# Patient Record
Sex: FEMALE | Race: WHITE | NOT HISPANIC OR LATINO | Employment: PART TIME | URBAN - METROPOLITAN AREA
[De-identification: names, ages, dates, MRNs, and addresses within clinical notes are randomized per-mention and may not be internally consistent; named-entity substitution may affect disease eponyms.]

---

## 2022-12-26 ENCOUNTER — APPOINTMENT (OUTPATIENT)
Dept: RADIOLOGY | Facility: MEDICAL CENTER | Age: 22
DRG: 518 | End: 2022-12-26
Attending: EMERGENCY MEDICINE
Payer: COMMERCIAL

## 2022-12-26 ENCOUNTER — APPOINTMENT (OUTPATIENT)
Dept: RADIOLOGY | Facility: MEDICAL CENTER | Age: 22
DRG: 518 | End: 2022-12-26
Payer: COMMERCIAL

## 2022-12-26 ENCOUNTER — HOSPITAL ENCOUNTER (INPATIENT)
Facility: MEDICAL CENTER | Age: 22
LOS: 4 days | DRG: 518 | End: 2022-12-30
Attending: EMERGENCY MEDICINE | Admitting: SURGERY
Payer: COMMERCIAL

## 2022-12-26 DIAGNOSIS — S32.591A CLOSED FRACTURE OF RAMUS OF RIGHT PUBIS, INITIAL ENCOUNTER (HCC): ICD-10-CM

## 2022-12-26 DIAGNOSIS — S22.31XA CLOSED FRACTURE OF ONE RIB OF RIGHT SIDE, INITIAL ENCOUNTER: ICD-10-CM

## 2022-12-26 DIAGNOSIS — S32.10XA CLOSED FRACTURE OF SACRUM, UNSPECIFIED PORTION OF SACRUM, INITIAL ENCOUNTER (HCC): ICD-10-CM

## 2022-12-26 DIAGNOSIS — S32.401A CLOSED DISPLACED FRACTURE OF RIGHT ACETABULUM, UNSPECIFIED PORTION OF ACETABULUM, INITIAL ENCOUNTER (HCC): ICD-10-CM

## 2022-12-26 DIAGNOSIS — S32.009A CLOSED FRACTURE OF TRANSVERSE PROCESS OF LUMBAR VERTEBRA, INITIAL ENCOUNTER (HCC): ICD-10-CM
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
## 2022-12-26 PROBLEM — Z78.9 NO CONTRAINDICATION TO DEEP VEIN THROMBOSIS (DVT) PROPHYLAXIS: Status: ACTIVE | Noted: 2022-12-26

## 2022-12-26 PROBLEM — S32.82XA MULTIPLE FRACTURES OF PELVIS WITHOUT DISRUPTION OF PELVIC RING, INITIAL ENCOUNTER FOR CLOSED FRACTURE (HCC): Status: ACTIVE | Noted: 2022-12-26

## 2022-12-26 PROBLEM — T14.90XA TRAUMA: Status: ACTIVE | Noted: 2022-12-26

## 2022-12-26 LAB
ABO GROUP BLD: NORMAL
ALBUMIN SERPL BCP-MCNC: 4.4 G/DL (ref 3.2–4.9)
ALBUMIN/GLOB SERPL: 1.5 G/DL
ALP SERPL-CCNC: 91 U/L (ref 30–99)
ALT SERPL-CCNC: 33 U/L (ref 2–50)
ANION GAP SERPL CALC-SCNC: 14 MMOL/L (ref 7–16)
AST SERPL-CCNC: 48 U/L (ref 12–45)
BILIRUB SERPL-MCNC: 0.4 MG/DL (ref 0.1–1.5)
BLD GP AB SCN SERPL QL: NORMAL
BUN SERPL-MCNC: 17 MG/DL (ref 8–22)
CALCIUM ALBUM COR SERPL-MCNC: 8.9 MG/DL (ref 8.5–10.5)
CALCIUM SERPL-MCNC: 9.2 MG/DL (ref 8.5–10.5)
CHLORIDE SERPL-SCNC: 103 MMOL/L (ref 96–112)
CO2 SERPL-SCNC: 21 MMOL/L (ref 20–33)
CREAT SERPL-MCNC: 0.57 MG/DL (ref 0.5–1.4)
ERYTHROCYTE [DISTWIDTH] IN BLOOD BY AUTOMATED COUNT: 40.9 FL (ref 35.9–50)
ETHANOL BLD-MCNC: <10.1 MG/DL
GFR SERPLBLD CREATININE-BSD FMLA CKD-EPI: 131 ML/MIN/1.73 M 2
GLOBULIN SER CALC-MCNC: 2.9 G/DL (ref 1.9–3.5)
GLUCOSE SERPL-MCNC: 104 MG/DL (ref 65–99)
HCG SERPL QL: NEGATIVE
HCT VFR BLD AUTO: 34.9 % (ref 37–47)
HGB BLD-MCNC: 11.9 G/DL (ref 12–16)
MCH RBC QN AUTO: 31.9 PG (ref 27–33)
MCHC RBC AUTO-ENTMCNC: 34.1 G/DL (ref 33.6–35)
MCV RBC AUTO: 93.6 FL (ref 81.4–97.8)
PLATELET # BLD AUTO: 218 K/UL (ref 164–446)
PMV BLD AUTO: 10.6 FL (ref 9–12.9)
POTASSIUM SERPL-SCNC: 3.2 MMOL/L (ref 3.6–5.5)
PROT SERPL-MCNC: 7.3 G/DL (ref 6–8.2)
RBC # BLD AUTO: 3.73 M/UL (ref 4.2–5.4)
RH BLD: NORMAL
SODIUM SERPL-SCNC: 138 MMOL/L (ref 135–145)
WBC # BLD AUTO: 12.9 K/UL (ref 4.8–10.8)

## 2022-12-26 PROCEDURE — 700111 HCHG RX REV CODE 636 W/ 250 OVERRIDE (IP): Performed by: NURSE PRACTITIONER

## 2022-12-26 PROCEDURE — 82077 ASSAY SPEC XCP UR&BREATH IA: CPT

## 2022-12-26 PROCEDURE — 700111 HCHG RX REV CODE 636 W/ 250 OVERRIDE (IP): Performed by: SURGERY

## 2022-12-26 PROCEDURE — 700102 HCHG RX REV CODE 250 W/ 637 OVERRIDE(OP): Performed by: SURGERY

## 2022-12-26 PROCEDURE — 700101 HCHG RX REV CODE 250: Performed by: SURGERY

## 2022-12-26 PROCEDURE — 80053 COMPREHEN METABOLIC PANEL: CPT

## 2022-12-26 PROCEDURE — 86850 RBC ANTIBODY SCREEN: CPT

## 2022-12-26 PROCEDURE — 86901 BLOOD TYPING SEROLOGIC RH(D): CPT

## 2022-12-26 PROCEDURE — 99291 CRITICAL CARE FIRST HOUR: CPT

## 2022-12-26 PROCEDURE — 72131 CT LUMBAR SPINE W/O DYE: CPT

## 2022-12-26 PROCEDURE — 86900 BLOOD TYPING SEROLOGIC ABO: CPT

## 2022-12-26 PROCEDURE — 99223 1ST HOSP IP/OBS HIGH 75: CPT | Performed by: SURGERY

## 2022-12-26 PROCEDURE — 72128 CT CHEST SPINE W/O DYE: CPT

## 2022-12-26 PROCEDURE — 700105 HCHG RX REV CODE 258: Performed by: SURGERY

## 2022-12-26 PROCEDURE — 85027 COMPLETE CBC AUTOMATED: CPT

## 2022-12-26 PROCEDURE — 700111 HCHG RX REV CODE 636 W/ 250 OVERRIDE (IP): Performed by: EMERGENCY MEDICINE

## 2022-12-26 PROCEDURE — 72192 CT PELVIS W/O DYE: CPT

## 2022-12-26 PROCEDURE — 96376 TX/PRO/DX INJ SAME DRUG ADON: CPT

## 2022-12-26 PROCEDURE — 71260 CT THORAX DX C+: CPT

## 2022-12-26 PROCEDURE — 84703 CHORIONIC GONADOTROPIN ASSAY: CPT

## 2022-12-26 PROCEDURE — 96374 THER/PROPH/DIAG INJ IV PUSH: CPT

## 2022-12-26 PROCEDURE — 770022 HCHG ROOM/CARE - ICU (200)

## 2022-12-26 PROCEDURE — G0390 TRAUMA RESPONS W/HOSP CRITI: HCPCS

## 2022-12-26 PROCEDURE — 700117 HCHG RX CONTRAST REV CODE 255: Performed by: EMERGENCY MEDICINE

## 2022-12-26 PROCEDURE — 99223 1ST HOSP IP/OBS HIGH 75: CPT | Mod: 57 | Performed by: ORTHOPAEDIC SURGERY

## 2022-12-26 PROCEDURE — A9270 NON-COVERED ITEM OR SERVICE: HCPCS | Performed by: SURGERY

## 2022-12-26 RX ORDER — OXYCODONE HYDROCHLORIDE 5 MG/1
5 TABLET ORAL
Status: DISCONTINUED | OUTPATIENT
Start: 2022-12-27 | End: 2022-12-30 | Stop reason: HOSPADM

## 2022-12-26 RX ORDER — ENEMA 19; 7 G/133ML; G/133ML
1 ENEMA RECTAL
Status: DISCONTINUED | OUTPATIENT
Start: 2022-12-26 | End: 2022-12-30 | Stop reason: HOSPADM

## 2022-12-26 RX ORDER — ENOXAPARIN SODIUM 100 MG/ML
30 INJECTION SUBCUTANEOUS EVERY 12 HOURS
Status: DISCONTINUED | OUTPATIENT
Start: 2022-12-27 | End: 2022-12-29

## 2022-12-26 RX ORDER — ACETAMINOPHEN 325 MG/1
650 TABLET ORAL 4 TIMES DAILY
Status: DISCONTINUED | OUTPATIENT
Start: 2022-12-26 | End: 2022-12-30 | Stop reason: HOSPADM

## 2022-12-26 RX ORDER — ONDANSETRON 2 MG/ML
4 INJECTION INTRAMUSCULAR; INTRAVENOUS EVERY 4 HOURS PRN
Status: DISCONTINUED | OUTPATIENT
Start: 2022-12-26 | End: 2022-12-30 | Stop reason: HOSPADM

## 2022-12-26 RX ORDER — HYDROMORPHONE HYDROCHLORIDE 1 MG/ML
0.5 INJECTION, SOLUTION INTRAMUSCULAR; INTRAVENOUS; SUBCUTANEOUS
Status: DISCONTINUED | OUTPATIENT
Start: 2022-12-26 | End: 2022-12-26

## 2022-12-26 RX ORDER — OXYCODONE HYDROCHLORIDE 10 MG/1
10 TABLET ORAL
Status: DISCONTINUED | OUTPATIENT
Start: 2022-12-26 | End: 2022-12-26

## 2022-12-26 RX ORDER — ONDANSETRON 4 MG/1
4 TABLET, ORALLY DISINTEGRATING ORAL EVERY 4 HOURS PRN
Status: DISCONTINUED | OUTPATIENT
Start: 2022-12-26 | End: 2022-12-30 | Stop reason: HOSPADM

## 2022-12-26 RX ORDER — AMOXICILLIN 250 MG
1 CAPSULE ORAL
Status: DISCONTINUED | OUTPATIENT
Start: 2022-12-26 | End: 2022-12-30 | Stop reason: HOSPADM

## 2022-12-26 RX ORDER — PROMETHAZINE HYDROCHLORIDE 25 MG/1
25 SUPPOSITORY RECTAL EVERY 6 HOURS PRN
Status: DISCONTINUED | OUTPATIENT
Start: 2022-12-26 | End: 2022-12-30 | Stop reason: HOSPADM

## 2022-12-26 RX ORDER — BISACODYL 10 MG
10 SUPPOSITORY, RECTAL RECTAL
Status: DISCONTINUED | OUTPATIENT
Start: 2022-12-26 | End: 2022-12-30 | Stop reason: HOSPADM

## 2022-12-26 RX ORDER — LIDOCAINE 50 MG/G
1 PATCH TOPICAL EVERY 24 HOURS
Status: DISCONTINUED | OUTPATIENT
Start: 2022-12-26 | End: 2022-12-30 | Stop reason: HOSPADM

## 2022-12-26 RX ORDER — OXYCODONE HYDROCHLORIDE 10 MG/1
10 TABLET ORAL
Status: DISCONTINUED | OUTPATIENT
Start: 2022-12-27 | End: 2022-12-30 | Stop reason: HOSPADM

## 2022-12-26 RX ORDER — CELECOXIB 200 MG/1
200 CAPSULE ORAL 2 TIMES DAILY
Status: DISCONTINUED | OUTPATIENT
Start: 2022-12-26 | End: 2022-12-30 | Stop reason: HOSPADM

## 2022-12-26 RX ORDER — SODIUM CHLORIDE, SODIUM LACTATE, POTASSIUM CHLORIDE, CALCIUM CHLORIDE 600; 310; 30; 20 MG/100ML; MG/100ML; MG/100ML; MG/100ML
INJECTION, SOLUTION INTRAVENOUS CONTINUOUS
Status: DISCONTINUED | OUTPATIENT
Start: 2022-12-26 | End: 2022-12-28

## 2022-12-26 RX ORDER — AMOXICILLIN 250 MG
1 CAPSULE ORAL NIGHTLY
Status: DISCONTINUED | OUTPATIENT
Start: 2022-12-26 | End: 2022-12-30 | Stop reason: HOSPADM

## 2022-12-26 RX ORDER — HYDROMORPHONE HYDROCHLORIDE 1 MG/ML
.5-1 INJECTION, SOLUTION INTRAMUSCULAR; INTRAVENOUS; SUBCUTANEOUS
Status: DISCONTINUED | OUTPATIENT
Start: 2022-12-27 | End: 2022-12-28

## 2022-12-26 RX ORDER — CELECOXIB 200 MG/1
200 CAPSULE ORAL 2 TIMES DAILY PRN
Status: DISCONTINUED | OUTPATIENT
Start: 2022-12-31 | End: 2022-12-30 | Stop reason: HOSPADM

## 2022-12-26 RX ORDER — OXYCODONE HYDROCHLORIDE 5 MG/1
5 TABLET ORAL
Status: DISCONTINUED | OUTPATIENT
Start: 2022-12-26 | End: 2022-12-26

## 2022-12-26 RX ORDER — PROCHLORPERAZINE EDISYLATE 5 MG/ML
10 INJECTION INTRAMUSCULAR; INTRAVENOUS EVERY 6 HOURS PRN
Status: DISCONTINUED | OUTPATIENT
Start: 2022-12-26 | End: 2022-12-30 | Stop reason: HOSPADM

## 2022-12-26 RX ORDER — GABAPENTIN 100 MG/1
100 CAPSULE ORAL EVERY 8 HOURS
Status: DISCONTINUED | OUTPATIENT
Start: 2022-12-26 | End: 2022-12-30 | Stop reason: HOSPADM

## 2022-12-26 RX ORDER — METAXALONE 800 MG/1
800 TABLET ORAL 3 TIMES DAILY
Status: DISCONTINUED | OUTPATIENT
Start: 2022-12-26 | End: 2022-12-30 | Stop reason: HOSPADM

## 2022-12-26 RX ORDER — MORPHINE SULFATE 4 MG/ML
2 INJECTION INTRAVENOUS
Status: COMPLETED | OUTPATIENT
Start: 2022-12-26 | End: 2022-12-26

## 2022-12-26 RX ORDER — DOCUSATE SODIUM 100 MG/1
100 CAPSULE, LIQUID FILLED ORAL 2 TIMES DAILY
Status: DISCONTINUED | OUTPATIENT
Start: 2022-12-26 | End: 2022-12-30 | Stop reason: HOSPADM

## 2022-12-26 RX ORDER — POLYETHYLENE GLYCOL 3350 17 G/17G
1 POWDER, FOR SOLUTION ORAL 2 TIMES DAILY
Status: DISCONTINUED | OUTPATIENT
Start: 2022-12-26 | End: 2022-12-30 | Stop reason: HOSPADM

## 2022-12-26 RX ADMIN — CELECOXIB 200 MG: 200 CAPSULE ORAL at 18:04

## 2022-12-26 RX ADMIN — PROCHLORPERAZINE EDISYLATE 5 MG: 5 INJECTION INTRAMUSCULAR; INTRAVENOUS at 22:15

## 2022-12-26 RX ADMIN — METAXALONE 800 MG: 800 TABLET ORAL at 18:03

## 2022-12-26 RX ADMIN — SODIUM CHLORIDE, POTASSIUM CHLORIDE, SODIUM LACTATE AND CALCIUM CHLORIDE: 600; 310; 30; 20 INJECTION, SOLUTION INTRAVENOUS at 18:21

## 2022-12-26 RX ADMIN — MORPHINE SULFATE 2 MG: 4 INJECTION, SOLUTION INTRAMUSCULAR; INTRAVENOUS at 16:21

## 2022-12-26 RX ADMIN — ACETAMINOPHEN 650 MG: 325 TABLET ORAL at 18:04

## 2022-12-26 RX ADMIN — LIDOCAINE 1 PATCH: 50 PATCH CUTANEOUS at 17:30

## 2022-12-26 RX ADMIN — IOHEXOL 100 ML: 350 INJECTION, SOLUTION INTRAVENOUS at 15:59

## 2022-12-26 RX ADMIN — DOCUSATE SODIUM 100 MG: 100 CAPSULE, LIQUID FILLED ORAL at 18:04

## 2022-12-26 RX ADMIN — OXYCODONE 5 MG: 5 TABLET ORAL at 22:05

## 2022-12-26 RX ADMIN — ONDANSETRON 4 MG: 4 TABLET, ORALLY DISINTEGRATING ORAL at 21:44

## 2022-12-26 RX ADMIN — GABAPENTIN 100 MG: 100 CAPSULE ORAL at 18:03

## 2022-12-26 RX ADMIN — MORPHINE SULFATE 2 MG: 4 INJECTION, SOLUTION INTRAMUSCULAR; INTRAVENOUS at 17:05

## 2022-12-26 RX ADMIN — HYDROMORPHONE HYDROCHLORIDE 0.5 MG: 1 INJECTION, SOLUTION INTRAMUSCULAR; INTRAVENOUS; SUBCUTANEOUS at 23:09

## 2022-12-26 ASSESSMENT — PAIN DESCRIPTION - PAIN TYPE
TYPE: ACUTE PAIN
TYPE: ACUTE PAIN

## 2022-12-26 ASSESSMENT — LIFESTYLE VARIABLES: DO YOU DRINK ALCOHOL: NO

## 2022-12-26 NOTE — ED NOTES
Pt to blue pod from CT, reports her pain is increasing.  Bilateral mid/lower back pain that wraps around to her thighs.
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
## 2022-12-27 ENCOUNTER — APPOINTMENT (OUTPATIENT)
Dept: RADIOLOGY | Facility: MEDICAL CENTER | Age: 22
DRG: 518 | End: 2022-12-27
Attending: ORTHOPAEDIC SURGERY
Payer: COMMERCIAL

## 2022-12-27 ENCOUNTER — APPOINTMENT (OUTPATIENT)
Dept: RADIOLOGY | Facility: MEDICAL CENTER | Age: 22
DRG: 518 | End: 2022-12-27
Attending: SURGERY
Payer: COMMERCIAL

## 2022-12-27 ENCOUNTER — ANESTHESIA (OUTPATIENT)
Dept: SURGERY | Facility: MEDICAL CENTER | Age: 22
DRG: 518 | End: 2022-12-27
Payer: COMMERCIAL

## 2022-12-27 ENCOUNTER — ANESTHESIA EVENT (OUTPATIENT)
Dept: SURGERY | Facility: MEDICAL CENTER | Age: 22
DRG: 518 | End: 2022-12-27
Payer: COMMERCIAL

## 2022-12-27 PROBLEM — D62 ACUTE BLOOD LOSS ANEMIA: Status: ACTIVE | Noted: 2022-12-27

## 2022-12-27 PROBLEM — Z02.9 DISCHARGE PLANNING ISSUES: Status: ACTIVE | Noted: 2022-12-27

## 2022-12-27 PROBLEM — E87.6 HYPOKALEMIA: Status: ACTIVE | Noted: 2022-12-27

## 2022-12-27 LAB
ABO + RH BLD: NORMAL
ANION GAP SERPL CALC-SCNC: 9 MMOL/L (ref 7–16)
BASOPHILS # BLD AUTO: 0.4 % (ref 0–1.8)
BASOPHILS # BLD: 0.04 K/UL (ref 0–0.12)
BUN SERPL-MCNC: 10 MG/DL (ref 8–22)
CALCIUM SERPL-MCNC: 8.9 MG/DL (ref 8.5–10.5)
CHLORIDE SERPL-SCNC: 101 MMOL/L (ref 96–112)
CO2 SERPL-SCNC: 23 MMOL/L (ref 20–33)
CREAT SERPL-MCNC: 0.42 MG/DL (ref 0.5–1.4)
EOSINOPHIL # BLD AUTO: 0 K/UL (ref 0–0.51)
EOSINOPHIL NFR BLD: 0 % (ref 0–6.9)
ERYTHROCYTE [DISTWIDTH] IN BLOOD BY AUTOMATED COUNT: 41.4 FL (ref 35.9–50)
ERYTHROCYTE [DISTWIDTH] IN BLOOD BY AUTOMATED COUNT: 41.5 FL (ref 35.9–50)
GFR SERPLBLD CREATININE-BSD FMLA CKD-EPI: 141 ML/MIN/1.73 M 2
GLUCOSE SERPL-MCNC: 112 MG/DL (ref 65–99)
HCT VFR BLD AUTO: 30.5 % (ref 37–47)
HCT VFR BLD AUTO: 31.3 % (ref 37–47)
HGB BLD-MCNC: 10.4 G/DL (ref 12–16)
HGB BLD-MCNC: 10.5 G/DL (ref 12–16)
HGB BLD-MCNC: 11.3 G/DL (ref 12–16)
HGB RETIC QN AUTO: 36.5 PG/CELL (ref 29–35)
IMM GRANULOCYTES # BLD AUTO: 0.08 K/UL (ref 0–0.11)
IMM GRANULOCYTES NFR BLD AUTO: 0.8 % (ref 0–0.9)
IMM RETICS NFR: 10 % (ref 9.3–17.4)
IRON SATN MFR SERPL: 18 % (ref 15–55)
IRON SERPL-MCNC: 63 UG/DL (ref 40–170)
LYMPHOCYTES # BLD AUTO: 1.11 K/UL (ref 1–4.8)
LYMPHOCYTES NFR BLD: 10.8 % (ref 22–41)
MAGNESIUM SERPL-MCNC: 1.7 MG/DL (ref 1.5–2.5)
MCH RBC QN AUTO: 31.4 PG (ref 27–33)
MCH RBC QN AUTO: 31.9 PG (ref 27–33)
MCHC RBC AUTO-ENTMCNC: 33.5 G/DL (ref 33.6–35)
MCHC RBC AUTO-ENTMCNC: 34.1 G/DL (ref 33.6–35)
MCV RBC AUTO: 93.6 FL (ref 81.4–97.8)
MCV RBC AUTO: 93.7 FL (ref 81.4–97.8)
MONOCYTES # BLD AUTO: 0.59 K/UL (ref 0–0.85)
MONOCYTES NFR BLD AUTO: 5.7 % (ref 0–13.4)
NEUTROPHILS # BLD AUTO: 8.48 K/UL (ref 2–7.15)
NEUTROPHILS NFR BLD: 82.3 % (ref 44–72)
NRBC # BLD AUTO: 0 K/UL
NRBC BLD-RTO: 0 /100 WBC
PHOSPHATE SERPL-MCNC: 3.1 MG/DL (ref 2.5–4.5)
PLATELET # BLD AUTO: 147 K/UL (ref 164–446)
PLATELET # BLD AUTO: 155 K/UL (ref 164–446)
PMV BLD AUTO: 10.7 FL (ref 9–12.9)
PMV BLD AUTO: 10.8 FL (ref 9–12.9)
POTASSIUM SERPL-SCNC: 3.3 MMOL/L (ref 3.6–5.5)
RBC # BLD AUTO: 3.26 M/UL (ref 4.2–5.4)
RBC # BLD AUTO: 3.34 M/UL (ref 4.2–5.4)
RETICS # AUTO: 0.04 M/UL (ref 0.04–0.06)
RETICS/RBC NFR: 1.2 % (ref 0.8–2.1)
SODIUM SERPL-SCNC: 133 MMOL/L (ref 135–145)
TIBC SERPL-MCNC: 341 UG/DL (ref 250–450)
UIBC SERPL-MCNC: 278 UG/DL (ref 110–370)
WBC # BLD AUTO: 10.3 K/UL (ref 4.8–10.8)
WBC # BLD AUTO: 9.6 K/UL (ref 4.8–10.8)

## 2022-12-27 PROCEDURE — 83550 IRON BINDING TEST: CPT

## 2022-12-27 PROCEDURE — 71045 X-RAY EXAM CHEST 1 VIEW: CPT

## 2022-12-27 PROCEDURE — 700102 HCHG RX REV CODE 250 W/ 637 OVERRIDE(OP): Performed by: ANESTHESIOLOGY

## 2022-12-27 PROCEDURE — 160028 HCHG SURGERY MINUTES - 1ST 30 MINS LEVEL 3: Performed by: ORTHOPAEDIC SURGERY

## 2022-12-27 PROCEDURE — 85027 COMPLETE CBC AUTOMATED: CPT

## 2022-12-27 PROCEDURE — 110371 HCHG SHELL REV 272: Performed by: ORTHOPAEDIC SURGERY

## 2022-12-27 PROCEDURE — 85025 COMPLETE CBC W/AUTO DIFF WBC: CPT

## 2022-12-27 PROCEDURE — 0QS134Z REPOSITION SACRUM WITH INTERNAL FIXATION DEVICE, PERCUTANEOUS APPROACH: ICD-10-PCS | Performed by: ORTHOPAEDIC SURGERY

## 2022-12-27 PROCEDURE — 85018 HEMOGLOBIN: CPT

## 2022-12-27 PROCEDURE — 700102 HCHG RX REV CODE 250 W/ 637 OVERRIDE(OP): Performed by: NURSE PRACTITIONER

## 2022-12-27 PROCEDURE — 27216 TREAT PELVIC RING FRACTURE: CPT | Mod: 80ROC,59,RT | Performed by: STUDENT IN AN ORGANIZED HEALTH CARE EDUCATION/TRAINING PROGRAM

## 2022-12-27 PROCEDURE — 770022 HCHG ROOM/CARE - ICU (200)

## 2022-12-27 PROCEDURE — 99233 SBSQ HOSP IP/OBS HIGH 50: CPT | Performed by: SURGERY

## 2022-12-27 PROCEDURE — 700102 HCHG RX REV CODE 250 W/ 637 OVERRIDE(OP): Performed by: SURGERY

## 2022-12-27 PROCEDURE — 83540 ASSAY OF IRON: CPT

## 2022-12-27 PROCEDURE — 700111 HCHG RX REV CODE 636 W/ 250 OVERRIDE (IP): Performed by: SURGERY

## 2022-12-27 PROCEDURE — A9270 NON-COVERED ITEM OR SERVICE: HCPCS | Performed by: SURGERY

## 2022-12-27 PROCEDURE — A9270 NON-COVERED ITEM OR SERVICE: HCPCS | Performed by: NURSE PRACTITIONER

## 2022-12-27 PROCEDURE — 72190 X-RAY EXAM OF PELVIS: CPT

## 2022-12-27 PROCEDURE — 700105 HCHG RX REV CODE 258: Performed by: ORTHOPAEDIC SURGERY

## 2022-12-27 PROCEDURE — 80048 BASIC METABOLIC PNL TOTAL CA: CPT

## 2022-12-27 PROCEDURE — 700111 HCHG RX REV CODE 636 W/ 250 OVERRIDE (IP): Performed by: ANESTHESIOLOGY

## 2022-12-27 PROCEDURE — 160035 HCHG PACU - 1ST 60 MINS PHASE I: Performed by: ORTHOPAEDIC SURGERY

## 2022-12-27 PROCEDURE — 160002 HCHG RECOVERY MINUTES (STAT): Performed by: ORTHOPAEDIC SURGERY

## 2022-12-27 PROCEDURE — 85046 RETICYTE/HGB CONCENTRATE: CPT

## 2022-12-27 PROCEDURE — 01160 ANES CLSD PX SYMPH PUB/SI JT: CPT | Performed by: ANESTHESIOLOGY

## 2022-12-27 PROCEDURE — 700105 HCHG RX REV CODE 258: Performed by: SURGERY

## 2022-12-27 PROCEDURE — 700111 HCHG RX REV CODE 636 W/ 250 OVERRIDE (IP): Performed by: ORTHOPAEDIC SURGERY

## 2022-12-27 PROCEDURE — 160048 HCHG OR STATISTICAL LEVEL 1-5: Performed by: ORTHOPAEDIC SURGERY

## 2022-12-27 PROCEDURE — 84100 ASSAY OF PHOSPHORUS: CPT

## 2022-12-27 PROCEDURE — 83735 ASSAY OF MAGNESIUM: CPT

## 2022-12-27 PROCEDURE — A9270 NON-COVERED ITEM OR SERVICE: HCPCS | Performed by: ANESTHESIOLOGY

## 2022-12-27 PROCEDURE — 700111 HCHG RX REV CODE 636 W/ 250 OVERRIDE (IP): Performed by: NURSE PRACTITIONER

## 2022-12-27 PROCEDURE — C1713 ANCHOR/SCREW BN/BN,TIS/BN: HCPCS | Performed by: ORTHOPAEDIC SURGERY

## 2022-12-27 PROCEDURE — 160039 HCHG SURGERY MINUTES - EA ADDL 1 MIN LEVEL 3: Performed by: ORTHOPAEDIC SURGERY

## 2022-12-27 PROCEDURE — 160009 HCHG ANES TIME/MIN: Performed by: ORTHOPAEDIC SURGERY

## 2022-12-27 PROCEDURE — 27216 TREAT PELVIC RING FRACTURE: CPT | Mod: 59,RT | Performed by: ORTHOPAEDIC SURGERY

## 2022-12-27 PROCEDURE — 700101 HCHG RX REV CODE 250: Performed by: ANESTHESIOLOGY

## 2022-12-27 DEVICE — IMPLANTABLE DEVICE: Type: IMPLANTABLE DEVICE | Site: PELVIS | Status: FUNCTIONAL

## 2022-12-27 DEVICE — SCREW CANNULATED FULLY THREADED 7.3MM X 75MM (3TX3=9): Type: IMPLANTABLE DEVICE | Site: PELVIS | Status: FUNCTIONAL

## 2022-12-27 DEVICE — SCREW CANNULATED 32MM THREAD 7.3MM X 90MM (3TX3=9): Type: IMPLANTABLE DEVICE | Site: PELVIS | Status: FUNCTIONAL

## 2022-12-27 DEVICE — WASHER FOR 7.3MM CANNULATED SCREWS 13.0MM  (3TX18=54) (6EA/PK): Type: IMPLANTABLE DEVICE | Site: PELVIS | Status: FUNCTIONAL

## 2022-12-27 RX ORDER — HYDROMORPHONE HYDROCHLORIDE 1 MG/ML
0.2 INJECTION, SOLUTION INTRAMUSCULAR; INTRAVENOUS; SUBCUTANEOUS
Status: DISCONTINUED | OUTPATIENT
Start: 2022-12-27 | End: 2022-12-27 | Stop reason: HOSPADM

## 2022-12-27 RX ORDER — HALOPERIDOL 5 MG/ML
1 INJECTION INTRAMUSCULAR
Status: DISCONTINUED | OUTPATIENT
Start: 2022-12-27 | End: 2022-12-27 | Stop reason: HOSPADM

## 2022-12-27 RX ORDER — POTASSIUM CHLORIDE 7.45 MG/ML
10 INJECTION INTRAVENOUS
Status: DISPENSED | OUTPATIENT
Start: 2022-12-27 | End: 2022-12-27

## 2022-12-27 RX ORDER — ONDANSETRON 2 MG/ML
INJECTION INTRAMUSCULAR; INTRAVENOUS PRN
Status: DISCONTINUED | OUTPATIENT
Start: 2022-12-27 | End: 2022-12-27 | Stop reason: SURG

## 2022-12-27 RX ORDER — DEXAMETHASONE SODIUM PHOSPHATE 4 MG/ML
INJECTION, SOLUTION INTRA-ARTICULAR; INTRALESIONAL; INTRAMUSCULAR; INTRAVENOUS; SOFT TISSUE PRN
Status: DISCONTINUED | OUTPATIENT
Start: 2022-12-27 | End: 2022-12-27 | Stop reason: SURG

## 2022-12-27 RX ORDER — HYDROMORPHONE HYDROCHLORIDE 1 MG/ML
0.1 INJECTION, SOLUTION INTRAMUSCULAR; INTRAVENOUS; SUBCUTANEOUS
Status: DISCONTINUED | OUTPATIENT
Start: 2022-12-27 | End: 2022-12-27 | Stop reason: HOSPADM

## 2022-12-27 RX ORDER — HYDROMORPHONE HYDROCHLORIDE 2 MG/ML
INJECTION, SOLUTION INTRAMUSCULAR; INTRAVENOUS; SUBCUTANEOUS PRN
Status: DISCONTINUED | OUTPATIENT
Start: 2022-12-27 | End: 2022-12-27 | Stop reason: SURG

## 2022-12-27 RX ORDER — HYDROMORPHONE HYDROCHLORIDE 1 MG/ML
0.4 INJECTION, SOLUTION INTRAMUSCULAR; INTRAVENOUS; SUBCUTANEOUS
Status: DISCONTINUED | OUTPATIENT
Start: 2022-12-27 | End: 2022-12-27 | Stop reason: HOSPADM

## 2022-12-27 RX ORDER — DIPHENHYDRAMINE HYDROCHLORIDE 50 MG/ML
12.5 INJECTION INTRAMUSCULAR; INTRAVENOUS
Status: DISCONTINUED | OUTPATIENT
Start: 2022-12-27 | End: 2022-12-27 | Stop reason: HOSPADM

## 2022-12-27 RX ORDER — OXYCODONE HCL 5 MG/5 ML
5 SOLUTION, ORAL ORAL
Status: COMPLETED | OUTPATIENT
Start: 2022-12-27 | End: 2022-12-27

## 2022-12-27 RX ORDER — ROCURONIUM BROMIDE 10 MG/ML
INJECTION, SOLUTION INTRAVENOUS PRN
Status: DISCONTINUED | OUTPATIENT
Start: 2022-12-27 | End: 2022-12-27 | Stop reason: SURG

## 2022-12-27 RX ORDER — OXYCODONE HCL 5 MG/5 ML
10 SOLUTION, ORAL ORAL
Status: COMPLETED | OUTPATIENT
Start: 2022-12-27 | End: 2022-12-27

## 2022-12-27 RX ORDER — ONDANSETRON 2 MG/ML
4 INJECTION INTRAMUSCULAR; INTRAVENOUS
Status: DISCONTINUED | OUTPATIENT
Start: 2022-12-27 | End: 2022-12-27 | Stop reason: HOSPADM

## 2022-12-27 RX ORDER — MEPERIDINE HYDROCHLORIDE 25 MG/ML
6.25 INJECTION INTRAMUSCULAR; INTRAVENOUS; SUBCUTANEOUS
Status: DISCONTINUED | OUTPATIENT
Start: 2022-12-27 | End: 2022-12-27 | Stop reason: HOSPADM

## 2022-12-27 RX ORDER — MIDAZOLAM HYDROCHLORIDE 1 MG/ML
INJECTION INTRAMUSCULAR; INTRAVENOUS PRN
Status: DISCONTINUED | OUTPATIENT
Start: 2022-12-27 | End: 2022-12-27 | Stop reason: SURG

## 2022-12-27 RX ORDER — CEFAZOLIN SODIUM 1 G/3ML
INJECTION, POWDER, FOR SOLUTION INTRAMUSCULAR; INTRAVENOUS PRN
Status: DISCONTINUED | OUTPATIENT
Start: 2022-12-27 | End: 2022-12-27 | Stop reason: SURG

## 2022-12-27 RX ADMIN — OXYCODONE HYDROCHLORIDE 5 MG: 5 SOLUTION ORAL at 15:37

## 2022-12-27 RX ADMIN — HYDROMORPHONE HYDROCHLORIDE 1 MG: 1 INJECTION, SOLUTION INTRAMUSCULAR; INTRAVENOUS; SUBCUTANEOUS at 04:11

## 2022-12-27 RX ADMIN — ROCURONIUM BROMIDE 40 MG: 10 INJECTION, SOLUTION INTRAVENOUS at 13:50

## 2022-12-27 RX ADMIN — HYDROMORPHONE HYDROCHLORIDE 1 MG: 1 INJECTION, SOLUTION INTRAMUSCULAR; INTRAVENOUS; SUBCUTANEOUS at 00:07

## 2022-12-27 RX ADMIN — ACETAMINOPHEN 650 MG: 325 TABLET ORAL at 06:15

## 2022-12-27 RX ADMIN — POLYETHYLENE GLYCOL 3350 1 PACKET: 17 POWDER, FOR SOLUTION ORAL at 17:13

## 2022-12-27 RX ADMIN — HYDROMORPHONE HYDROCHLORIDE 1 MG: 1 INJECTION, SOLUTION INTRAMUSCULAR; INTRAVENOUS; SUBCUTANEOUS at 07:42

## 2022-12-27 RX ADMIN — HYDROMORPHONE HYDROCHLORIDE 1 MG: 1 INJECTION, SOLUTION INTRAMUSCULAR; INTRAVENOUS; SUBCUTANEOUS at 19:47

## 2022-12-27 RX ADMIN — METAXALONE 800 MG: 800 TABLET ORAL at 06:28

## 2022-12-27 RX ADMIN — CEFAZOLIN 2 G: 330 INJECTION, POWDER, FOR SOLUTION INTRAMUSCULAR; INTRAVENOUS at 13:59

## 2022-12-27 RX ADMIN — OXYCODONE HYDROCHLORIDE 10 MG: 10 TABLET ORAL at 06:15

## 2022-12-27 RX ADMIN — ONDANSETRON 4 MG: 2 INJECTION INTRAMUSCULAR; INTRAVENOUS at 18:20

## 2022-12-27 RX ADMIN — MIDAZOLAM HYDROCHLORIDE 2 MG: 1 INJECTION, SOLUTION INTRAMUSCULAR; INTRAVENOUS at 13:42

## 2022-12-27 RX ADMIN — OXYCODONE HYDROCHLORIDE 10 MG: 10 TABLET ORAL at 02:05

## 2022-12-27 RX ADMIN — MEPERIDINE HYDROCHLORIDE 6.25 MG: 25 INJECTION INTRAMUSCULAR; INTRAVENOUS; SUBCUTANEOUS at 15:34

## 2022-12-27 RX ADMIN — OXYCODONE 5 MG: 5 TABLET ORAL at 18:51

## 2022-12-27 RX ADMIN — SODIUM CHLORIDE, POTASSIUM CHLORIDE, SODIUM LACTATE AND CALCIUM CHLORIDE: 600; 310; 30; 20 INJECTION, SOLUTION INTRAVENOUS at 04:15

## 2022-12-27 RX ADMIN — HYDROMORPHONE HYDROCHLORIDE 0.25 MG: 2 INJECTION INTRAMUSCULAR; INTRAVENOUS; SUBCUTANEOUS at 15:00

## 2022-12-27 RX ADMIN — DOCUSATE SODIUM 100 MG: 100 CAPSULE, LIQUID FILLED ORAL at 17:08

## 2022-12-27 RX ADMIN — POTASSIUM CHLORIDE 10 MEQ: 7.46 INJECTION, SOLUTION INTRAVENOUS at 10:11

## 2022-12-27 RX ADMIN — GABAPENTIN 100 MG: 100 CAPSULE ORAL at 17:08

## 2022-12-27 RX ADMIN — PROCHLORPERAZINE EDISYLATE 10 MG: 5 INJECTION INTRAMUSCULAR; INTRAVENOUS at 21:48

## 2022-12-27 RX ADMIN — PROPOFOL 150 MG: 10 INJECTION, EMULSION INTRAVENOUS at 13:49

## 2022-12-27 RX ADMIN — ACETAMINOPHEN 650 MG: 325 TABLET ORAL at 17:08

## 2022-12-27 RX ADMIN — ACETAMINOPHEN 650 MG: 325 TABLET ORAL at 00:10

## 2022-12-27 RX ADMIN — OXYCODONE HYDROCHLORIDE 10 MG: 10 TABLET ORAL at 22:06

## 2022-12-27 RX ADMIN — CELECOXIB 200 MG: 200 CAPSULE ORAL at 06:15

## 2022-12-27 RX ADMIN — FENTANYL CITRATE 50 MCG: 50 INJECTION, SOLUTION INTRAMUSCULAR; INTRAVENOUS at 13:49

## 2022-12-27 RX ADMIN — GABAPENTIN 100 MG: 100 CAPSULE ORAL at 10:12

## 2022-12-27 RX ADMIN — ONDANSETRON 4 MG: 2 INJECTION INTRAMUSCULAR; INTRAVENOUS at 09:32

## 2022-12-27 RX ADMIN — HYDROMORPHONE HYDROCHLORIDE 1 MG: 1 INJECTION, SOLUTION INTRAMUSCULAR; INTRAVENOUS; SUBCUTANEOUS at 03:06

## 2022-12-27 RX ADMIN — CEFAZOLIN 2 G: 2 INJECTION, POWDER, FOR SOLUTION INTRAMUSCULAR; INTRAVENOUS at 21:31

## 2022-12-27 RX ADMIN — ONDANSETRON 4 MG: 4 TABLET, ORALLY DISINTEGRATING ORAL at 16:27

## 2022-12-27 RX ADMIN — METAXALONE 800 MG: 800 TABLET ORAL at 18:25

## 2022-12-27 RX ADMIN — GABAPENTIN 100 MG: 100 CAPSULE ORAL at 00:10

## 2022-12-27 RX ADMIN — ONDANSETRON 4 MG: 2 INJECTION INTRAMUSCULAR; INTRAVENOUS at 14:51

## 2022-12-27 RX ADMIN — SUGAMMADEX 150 MG: 100 INJECTION, SOLUTION INTRAVENOUS at 14:53

## 2022-12-27 RX ADMIN — ENOXAPARIN SODIUM 30 MG: 30 INJECTION SUBCUTANEOUS at 17:08

## 2022-12-27 RX ADMIN — CELECOXIB 200 MG: 200 CAPSULE ORAL at 17:08

## 2022-12-27 RX ADMIN — SODIUM CHLORIDE, POTASSIUM CHLORIDE, SODIUM LACTATE AND CALCIUM CHLORIDE: 600; 310; 30; 20 INJECTION, SOLUTION INTRAVENOUS at 23:01

## 2022-12-27 RX ADMIN — DEXAMETHASONE SODIUM PHOSPHATE 6 MG: 4 INJECTION, SOLUTION INTRA-ARTICULAR; INTRALESIONAL; INTRAMUSCULAR; INTRAVENOUS; SOFT TISSUE at 13:59

## 2022-12-27 ASSESSMENT — PATIENT HEALTH QUESTIONNAIRE - PHQ9
2. FEELING DOWN, DEPRESSED, IRRITABLE, OR HOPELESS: NOT AT ALL
SUM OF ALL RESPONSES TO PHQ9 QUESTIONS 1 AND 2: 0
1. LITTLE INTEREST OR PLEASURE IN DOING THINGS: NOT AT ALL

## 2022-12-27 ASSESSMENT — LIFESTYLE VARIABLES
ON A TYPICAL DAY WHEN YOU DRINK ALCOHOL HOW MANY DRINKS DO YOU HAVE: 0
CONSUMPTION TOTAL: NEGATIVE
HOW MANY TIMES IN THE PAST YEAR HAVE YOU HAD 5 OR MORE DRINKS IN A DAY: 0
HAVE PEOPLE ANNOYED YOU BY CRITICIZING YOUR DRINKING: NO
DOES PATIENT WANT TO STOP DRINKING: NO
EVER HAD A DRINK FIRST THING IN THE MORNING TO STEADY YOUR NERVES TO GET RID OF A HANGOVER: NO
TOTAL SCORE: 0
HAVE YOU EVER FELT YOU SHOULD CUT DOWN ON YOUR DRINKING: NO
EVER FELT BAD OR GUILTY ABOUT YOUR DRINKING: NO
TOTAL SCORE: 0
ALCOHOL_USE: NO
AVERAGE NUMBER OF DAYS PER WEEK YOU HAVE A DRINK CONTAINING ALCOHOL: 0
TOTAL SCORE: 0

## 2022-12-27 ASSESSMENT — ENCOUNTER SYMPTOMS
FOCAL WEAKNESS: 0
NEUROLOGICAL NEGATIVE: 1
MYALGIAS: 1
PSYCHIATRIC NEGATIVE: 1
RESPIRATORY NEGATIVE: 1
SENSORY CHANGE: 0

## 2022-12-27 ASSESSMENT — PAIN SCALES - GENERAL: PAIN_LEVEL: 6

## 2022-12-27 NOTE — ANESTHESIA PROCEDURE NOTES
Airway    Date/Time: 12/27/2022 1:52 PM  Performed by: Catherine Carrillo M.D.  Authorized by: Catherine Carrillo M.D.     Location:  OR  Urgency:  Elective  Indications for Airway Management:  Anesthesia      Spontaneous Ventilation: absent    Sedation Level:  Deep  Preoxygenated: Yes    Patient Position:  Sniffing  Mask Difficulty Assessment:  1 - vent by mask  Final Airway Type:  Endotracheal airway  Final Endotracheal Airway:  ETT  Cuffed: Yes    Technique Used for Successful ETT Placement:  Direct laryngoscopy    Insertion Site:  Oral  Blade Type:  Malcolm  Laryngoscope Blade/Videolaryngoscope Blade Size:  3  ETT Size (mm):  7.0  Measured from:  Teeth  ETT to Teeth (cm):  19  Placement Verified by: auscultation and capnometry    Cormack-Lehane Classification:  Grade I - full view of glottis  Number of Attempts at Approach:  1  Ventilation Between Attempts:  BVM  Number of Other Approaches Attempted:  0
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
## 2022-12-27 NOTE — ED NOTES
Med rec completed per patient at bedside.  Allergies reviewed with patient. NKDA.  Patient from out of area. No preferred local pharmacy.     Patient denies using any prescription medications at home.  No recent over-the-counter medications.  No outpatient antibiotics within the last 30 days.
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
## 2022-12-27 NOTE — ASSESSMENT & PLAN NOTE
Acute comminuted displaced fractures of the bilateral sacral alar, right more than left.   Acute displaced fractures of the right inferior pubic ramus and right anterior acetabulum..  12/27 percutaneous fixation of bilateral sacral fractures.   Weight bearing status - Touch toe weightbearing RLE, Pivot LLE for 6 weeks.  Praneeth Goodwin MD. Orthopedic Surgeon. [facility].

## 2022-12-27 NOTE — ASSESSMENT & PLAN NOTE
Acute nondisplaced fracture of the right posterior 11th rib.  Aggressive pulmonary hygiene and multimodal pain management.

## 2022-12-27 NOTE — ANESTHESIA TIME REPORT
Anesthesia Start and Stop Event Times     Date Time Event    12/27/2022 01:31 PM Ready for Procedure    12/27/2022 01:42 PM Anesthesia Start    12/27/2022 03:10 PM Anesthesia Stop        Responsible Staff  12/27/22    Name Role Begin End    Catherine Carrillo M.D. Anesthesiologist 12/27/22 01:42 PM 12/27/22 03:10 PM        Overtime Reason:  overtime    Comments:

## 2022-12-27 NOTE — CARE PLAN
The patient is Watcher - Medium risk of patient condition declining or worsening    Shift Goals  Clinical Goals: Surgery.  Patient Goals: Rest, pain control.  Family Goals: GLENN    Progress made toward(s) clinical / shift goals:  The patient and family were updated on plan of care. Pain is controlled with PRN pain medication.     Problem: Knowledge Deficit - Standard  Goal: Patient and family/care givers will demonstrate understanding of plan of care, disease process/condition, diagnostic tests and medications  12/27/2022 1232 by Rhonda Shen, R.N.  Outcome: Progressing    Problem: Pain - Standard  Goal: Alleviation of pain or a reduction in pain to the patient’s comfort goal  12/27/2022 1232 by Rhonda Shen, R.N.  Outcome: Progressing     Problem: Skin Integrity  Goal: Skin integrity is maintained or improved  Outcome: Progressing

## 2022-12-27 NOTE — H&P
"    TRAUMA HISTORY AND PHYSICAL    DATE OF SERVICE: 12/26/2022    ACTIVATION LEVEL: Green.     HISTORY OF PRESENT ILLNESS: The patient is a 22 year-old female who was injured after crashing a sled into a concrete wall.     The patient was triaged to [facility] in accordance with established pre hospital protocols. An expeditious primary and secondary survey with required adjuncts was conducted. See Trauma Narrator for full details.    The patient reports ongoing pelvic and low back pain.  Vitals are reassuring.      PAST MEDICAL HISTORY: History reviewed. No pertinent past medical history.      PAST SURGICAL HISTORY: History reviewed. No pertinent surgical history.       ALLERGIES: Patient has no known allergies.      CURRENT MEDICATIONS:   Patient reports none    FAMILY HISTORY:   Reviewed and found to be non-contributory in regards to the above presentation    SOCIAL HISTORY:  reports that she has never smoked. She has never used smokeless tobacco. She reports that she does not currently use alcohol. She reports that she does not currently use drugs.      REVIEW OF SYSTEMS:   Comprehensive review of systems is negative with the exception of the aforementioned HPI, PMH, and PSH bullets in accordance with CMS guidelines.    PHYSICAL EXAMINATION:   Vital Signs: /58   Pulse 97   Temp 36.6 °C (97.9 °F) (Temporal)   Resp 17   Ht 1.626 m (5' 4\")   Wt 56.7 kg (125 lb)   SpO2 98%   Physical Exam  Constitutional:       Appearance: Normal appearance.   HENT:      Head: Normocephalic and atraumatic.      Right Ear: Hearing and external ear normal.      Left Ear: Hearing and external ear normal.      Nose: Nose normal.      Mouth/Throat:      Lips: Pink.      Mouth: Mucous membranes are moist.      Pharynx: Oropharynx is clear.   Eyes:      General: Lids are normal.      Conjunctiva/sclera: Conjunctivae normal.      Pupils: Pupils are equal, round, and reactive to light.   Neck:      Trachea: " Trachea normal.   Cardiovascular:      Rate and Rhythm: Normal rate and regular rhythm.      Pulses: Normal pulses.   Pulmonary:      Effort: Pulmonary effort is normal.      Breath sounds: Normal breath sounds and air entry. No decreased breath sounds.   Chest:      Chest wall: Tenderness present. No swelling or crepitus.   Abdominal:      General: Abdomen is flat. There is no distension.      Palpations: Abdomen is soft.      Tenderness: There is no abdominal tenderness.   Musculoskeletal:      Cervical back: Full passive range of motion without pain and neck supple.   Skin:     General: Skin is warm and dry.      Capillary Refill: Capillary refill takes less than 2 seconds.   Neurological:      General: No focal deficit present.      Mental Status: She is alert.      GCS: GCS eye subscore is 4. GCS verbal subscore is 5. GCS motor subscore is 6.      Cranial Nerves: Cranial nerves 2-12 are intact.      Sensory: Sensation is intact.      Motor: Motor function is intact.   Psychiatric:         Behavior: Behavior is cooperative.       LABORATORY VALUES:   Recent Labs     12/26/22  1528   WBC 12.9*   RBC 3.73*   HEMOGLOBIN 11.9*   HEMATOCRIT 34.9*   MCV 93.6   MCH 31.9   MCHC 34.1   RDW 40.9   PLATELETCT 218   MPV 10.6     Recent Labs     12/26/22  1528   SODIUM 138   POTASSIUM 3.2*   CHLORIDE 103   CO2 21   GLUCOSE 104*   BUN 17   CREATININE 0.57   CALCIUM 9.2     Recent Labs     12/26/22  1528   ASTSGOT 48*   ALTSGPT 33   TBILIRUBIN 0.4   ALKPHOSPHAT 91   GLOBULIN 2.9            IMAGING:   CT-LSPINE W/O PLUS RECONS   Final Result         Acute mildly displaced fractures of the right transverse processes of L1, L2, L3 and L4.         CT-TSPINE W/O PLUS RECONS   Final Result         1. No acute fracture or malalignment appreciated in the thoracic spine         CT-CHEST,ABDOMEN,PELVIS WITH   Final Result         1. Acute nondisplaced fracture of the right posterior 11th rib      2. Acute comminuted displaced fractures  of the bilateral sacral alar, right more than left.      3. Acute displaced fractures of the right inferior pubic ramus and right anterior acetabulum.      CT-PELVIS W/O PLUS RECONS    (Results Pending)       IMPRESSION AND PLAN:  * Multiple fractures of pelvis without disruption of pelvic ring, initial encounter for closed fracture (HCC)- (present on admission)  Assessment & Plan  Acute comminuted displaced fractures of the bilateral sacral alar, right more than left.   Acute displaced fractures of the right inferior pubic ramus and right anterior acetabulum..  Definitive plan pending.  Weight bearing status - Nonweightbearing BLE.  Praneeth Goodwin MD. Orthopedic Surgeon. [facility].      Closed fracture of transverse process of lumbar vertebra (HCC)- (present on admission)  Assessment & Plan  Acute mildly displaced fractures of the right transverse processes of L1, L2, L3 and L4.  Multi-modal pain regimen.    Fracture of one rib of right side- (present on admission)  Assessment & Plan  Acute nondisplaced fracture of the right posterior 11th rib   Multi-modal pain regimen  Follow up chest x-ray in am    Trauma- (present on admission)  Assessment & Plan  Green Activation  Sledding crash into concrete wall    No contraindication to deep vein thrombosis (DVT) prophylaxis- (present on admission)  Assessment & Plan  Prophylactic dose enoxaparin initiated upon admission.        I independently reviewed pertinent clinical lab tests since admission and ordered additional follow up clinical lab tests.  I independently reviewed pertinent radiographic images and the radiologist's reports since admission and ordered additional follow up radiographic studies.  The details of the available patient records in [facility] (including laboratory tests, culture data, medications, imaging, and other pertinent diagnostic tests) and that information was utilized as warranted in today's medical decision making for this patient.  I  personally evaluated the patient condition at bedside.    Care interventions include:   Review of interval medical and surgical history, current medications and outpatient medication reconciliation, interval imaging studies and radiologist interpretation, and interval laboratory values.  Management of rib fractures and orthopedic trauma.  coordination, prioritization, and implementation of therapeutic interventions for orthopedic injuries  Management of deep venous thrombosis prophylaxis.  Management of multi-modal pain regimen.    Aggregated care time spent evaluating, reassessing, reviewing documentation, providing care, and managing this patient exclusive of procedures: 60 minutes  ____________________________________   Del Howard M.D.     JARAD / NTS

## 2022-12-27 NOTE — ANESTHESIA POSTPROCEDURE EVALUATION
Patient: Yohana Alvarez    Procedure Summary     Date: 12/27/22 Room / Location: [place] [address] / SURGERY [facility]    Anesthesia Start: 1342 Anesthesia Stop: 1510    Procedure: PINNING, FRACTURE, PERCUTANEOUS (Bilateral: Coccyx) Diagnosis: (BILATERAL SACRAL FRACTURES)    Surgeons: Stephan Mensah M.D. Responsible Provider: Catherine Carrillo M.D.    Anesthesia Type: general ASA Status: 2          Final Anesthesia Type: general  Last vitals  BP   Blood Pressure: 97/52    Temp   36.2 °C (97.1 °F)    Pulse   69   Resp   12    SpO2   98 %      Anesthesia Post Evaluation    Patient location during evaluation: PACU  Patient participation: complete - patient participated  Level of consciousness: awake and alert  Pain score: 6    Airway patency: patent  Anesthetic complications: no  Cardiovascular status: hemodynamically stable  Respiratory status: acceptable  Hydration status: euvolemic    PONV: none          No notable events documented.     Nurse Pain Score: 6 (NPRS)

## 2022-12-27 NOTE — ANESTHESIA PREPROCEDURE EVALUATION
Case: [id number] Date/Time: 12/27/22 1055    Procedure: PINNING, FRACTURE, PERCUTANEOUS (Bilateral: Coccyx) - Sacral   7.3 Cannulated Screws OIC  Flat OSI    Anesthesia type: General    Location: [place] [address] / SURGERY [facility]    Surgeons: Stephan Mensah M.D.          Relevant Problems   No relevant active problems       Physical Exam    Airway   Mallampati: II  TM distance: >3 FB  Neck ROM: full       Cardiovascular - normal exam  Rhythm: regular  Rate: normal  (-) murmur     Dental - normal exam           Pulmonary - normal exam  Breath sounds clear to auscultation     Abdominal    Neurological - normal exam                 Anesthesia Plan    ASA 2       Plan - general       Airway plan will be ETT        Plan Factors:   Patient was previously instructed to abstain from smoking on day of procedure.  Patient did not smoke on day of procedure.      Induction: intravenous    Postoperative Plan: Postoperative administration of opioids is intended.        Informed Consent:    Anesthetic plan and risks discussed with patient.    Use of blood products discussed with: patient whom consented to blood products.

## 2022-12-27 NOTE — OP REPORT
DATE OF OPERATION: 12/27/2022     PREOPERATIVE DIAGNOSIS: 1.  Right sacral fracture  2.  Left sacral fracture    POSTOPERATIVE DIAGNOSIS: Same    PROCEDURE PERFORMED: 1.  Left S1 sacroiliac screw 2.  Right S1 sacroiliac screw 3.  Transiliac transsacral screw S2    SURGEON: Stephan Mensah M.D.     ASSISTANT: Peyman Gao    ANESTHESIOLOGIST: Catherine Carrillo MD.    ANESTHESIA: General    ESTIMATED BLOOD LOSS: 10 mL    INDICATIONS: The patient is a 22 y.o. female with a pelvic ring injury consisting of bilateral sacral fractures after sledding accident.  I discussed the risks and benefits of the procedure, including the risks of pain, stiffness, infection, wound healing complication, neurovascular injury, malunion, non-union, malrotation, growth arrest and the medical risks of anesthesia including DVT, PE, MI, stroke, and death.  Benefits include early mobilization, improved chance of union, and reduction in risks of growth deformity.  Alternatives to surgery were also discussed, including non-operative management.  The patients parent signed the informed consent and the operative extremity was marked.      PROCEDURE:  The patient underwent anesthesia, and was positioned supine on a radiolucent table and all bony prominences were well padded.  Preoperative antibiotics were administered. Sequential compression devices were employed. The correct operative site was prepped and draped into a sterile field. A procedural pause was conducted to verify correct patient, correct extremity, presence of the surgeons initials on the operative extremity.    A right S1 sacroiliac screw was placed under fluoroscopic guidance using AP inlet and outlet views.  Anatomic reduction was achieved and screw was found to be within bony quarters throughout its course.  A left S1 sacroiliac screw was then placed under fluoroscopic guidance using AP inlet and outlet views.  Anatomic reduction was achieved and the screw is found to be  within bony corridor doors throughout its course.  To achieve additional stability a transsacral transiliac screw was placed into S2 using AP inlet outlet and lateral views.  Good reduction was achieved throughout wounds irrigated and closed with staples    The patient tolerated the procedure well. There were no apparent complications. All sponge, needle, and instrument counts were correct on two separate occasions. She was awakened, extubated, and transferred to the recovery room in satisfactory condition.     The use of Peyman Gao as a surgical assistant was necessary for assistance with exposure, retraction, fracture reduction, instrumentation, and closure.    Post-Operative Plan:    1.  The patient should be nonweightbearing on right lower extremity and pivot on the left  2.  IV antibiotics - may be continued for 24 hours  3.  Discharge planning  ____________________________________   Stephan Mesnah M.D.   DD: 12/27/2022  3:08 PM

## 2022-12-27 NOTE — PROGRESS NOTES
Patient to S-120 on transport monitor ACLS RN. ICU monitor placed.   Vital Signs:   HR: 91  BP: 109/58  O2 saturation: 98%   RR: 18  Temp: 99.1  Weight: Unable to obtain at this time because the scale on the patient's bed is not working.   Personal Belongings: All of patient's personal belongings taken home by patient's mom.     4 Eyes Skin Assessment Completed by Eva RN and Isis RN.    Head WDL  Ears WDL  Nose WDL  Mouth WDL  Neck WDL  Breast/Chest WDL  Shoulder Blades WDL  Spine WDL  (R) Arm/Elbow/Hand WDL  (L) Arm/Elbow/Hand WDL  Abdomen WDL  Groin WDL  Scrotum/Coccyx/Buttocks WDL  (R) Leg WDL  (L) Leg WDL  (R) Heel/Foot/Toe Redness and Blanching  (L) Heel/Foot/Toe Redness and Blanching          Devices In Places ECG, Blood Pressure Cuff, Pulse Ox, and SCD's      Interventions In Place Sacral Mepilex, Pillows, Q2 Turns, Low Air Loss Mattress, and Pressure Redistribution Mattress    Possible Skin Injury No    Pictures Uploaded Into Epic N/A  Wound Consult Placed N/A  RN Wound Prevention Protocol Ordered No

## 2022-12-27 NOTE — OR NURSING
Patient recovered well in post-op. AAOx4. VSS, on 1L via NC. Surgical sites GLENN, surgical dressing in place CDI x3. Surgical pain managed through PO medication. Patient tolerating fluids without nausea. Unable to contact family for update. No belongings in pacu. Report called to ARACELI James. Patient transported on monitor w/ RN to S120. O2 tank full for transport.

## 2022-12-27 NOTE — ASSESSMENT & PLAN NOTE
Acute mildly displaced fractures of the right transverse processes of L1, L2, L3 and L4.  Multi-modal pain regimen.

## 2022-12-27 NOTE — PROGRESS NOTES
"      Mental status adequate for full examination?: Yes    Spine cleared (radiologically and/or clinically): Yes    REVIEW OF SYSTEMS:  Review of Systems   Constitutional:  Positive for malaise/fatigue.   HENT: Negative.     Respiratory: Negative.     Genitourinary:         Voiding   Musculoskeletal:  Positive for myalgias.   Neurological: Negative.  Negative for sensory change and focal weakness.   Psychiatric/Behavioral: Negative.     All other systems reviewed and are negative.    PHYSICAL EXAMINATION:  /67   Pulse 89   Temp 37.2 °C (99 °F) (Temporal)   Resp 14   Ht 1.626 m (5' 4\")   Wt 56.7 kg (125 lb)   LMP 11/29/2022 (Approximate)   SpO2 97%   BMI 21.46 kg/m²   Physical Exam  Vitals and nursing note reviewed.   Constitutional:       General: She is not in acute distress.     Appearance: Normal appearance.   HENT:      Right Ear: External ear normal.      Left Ear: External ear normal.      Nose: Nose normal.      Mouth/Throat:      Mouth: Mucous membranes are moist.      Pharynx: Oropharynx is clear.   Eyes:      General:         Right eye: No discharge.         Left eye: No discharge.      Pupils: Pupils are equal, round, and reactive to light.   Cardiovascular:      Rate and Rhythm: Normal rate and regular rhythm.      Pulses: Normal pulses.   Pulmonary:      Effort: Pulmonary effort is normal. No respiratory distress.      Breath sounds: Normal breath sounds.   Chest:      Chest wall: Tenderness present.   Abdominal:      General: There is no distension.      Palpations: Abdomen is soft.      Tenderness: There is no abdominal tenderness.   Musculoskeletal:         General: Tenderness present.      Cervical back: Normal range of motion. No rigidity. No muscular tenderness.      Comments: Pelvic tenderness   Lumbar tenderness    Skin:     General: Skin is warm.      Capillary Refill: Capillary refill takes less than 2 seconds.   Neurological:      General: No focal deficit present.      Mental " Status: She is alert and oriented to person, place, and time.   Psychiatric:         Mood and Affect: Mood normal.         Behavior: Behavior normal.         Thought Content: Thought content normal.       LABORATORY VALUES:  Recent Labs     12/26/22  1528 12/27/22  0005 12/27/22  0630   WBC 12.9*  --  10.3   RBC 3.73*  --  3.34*   HEMOGLOBIN 11.9* 11.3* 10.5*   HEMATOCRIT 34.9*  --  31.3*   MCV 93.6  --  93.7   MCH 31.9  --  31.4   MCHC 34.1  --  33.5*   RDW 40.9  --  41.5   PLATELETCT 218  --  155*   MPV 10.6  --  10.7     Recent Labs     12/26/22  1528 12/27/22  0630   SODIUM 138 133*   POTASSIUM 3.2* 3.3*   CHLORIDE 103 101   CO2 21 23   GLUCOSE 104* 112*   BUN 17 10   CREATININE 0.57 0.42*   CALCIUM 9.2 8.9     Recent Labs     12/26/22  1528   ASTSGOT 48*   ALTSGPT 33   TBILIRUBIN 0.4   ALKPHOSPHAT 91   GLOBULIN 2.9           IMAGING:  DX-CHEST-PORTABLE (1 VIEW)   Final Result      No acute cardiopulmonary abnormality. No pneumothorax detected.         CT-PELVIS W/O PLUS RECONS   Final Result         Acute comminuted displaced fractures of the bilateral sacral alar, right more than left.      Acute displaced fractures of the right inferior pubic ramus and right anterior acetabulum.      CT-LSPINE W/O PLUS RECONS   Final Result         Acute mildly displaced fractures of the right transverse processes of L1, L2, L3 and L4.         CT-TSPINE W/O PLUS RECONS   Final Result         1. No acute fracture or malalignment appreciated in the thoracic spine         CT-CHEST,ABDOMEN,PELVIS WITH   Final Result         1. Acute nondisplaced fracture of the right posterior 11th rib      2. Acute comminuted displaced fractures of the bilateral sacral alar, right more than left.      3. Acute displaced fractures of the right inferior pubic ramus and right anterior acetabulum.      DX-PORTABLE FLUOROSCOPY < 1 HOUR Is the patient pregnant? Unknown    (Results Pending)   DX-PELVIS-TRAUMA SERIES  3-    (Results Pending)       All  current laboratory studies/radiology exams reviewed: Yes    Medications reconciliation has been reviewed: Yes    Completed Consultations:  Orthopedics     Pending Consultations:  None    Newly Identified Diagnoses and Injuries:  None    RAP Score Total: 6    CAGE Results: not completed Blood Alcohol>0.08: no     Discussed patient condition with RN, Patient, and Dr. Jo .

## 2022-12-27 NOTE — CONSULTS
Time called: 1624   Time arrived and at bedside: 1715    Date of Service: 12/26/22    Yohana Alvarez was seen today in consultation for bilateral sacral fractures at the request of Dr. Petersen    CC: Low back pain    HPI: Yohana Alvarez is a 22 y.o. female who presents with complaints of pain to low back and buttock worse on the right than the left.  This started just prior to her arrival after a crash while sledding.  She is in town for the week and they were sliding near [facility].  She says that she hit a metal post at a high rate of speed.  This hit her directly in the back and buttock area.  This took her breath away initially and then when this returned she had pain in the low back and buttock area.  She was brought in and evaluated as a trauma.  The pain is 6/10 and is described as sharp.  The pain is made worse by palpation of the area and made better by rest and immobilization.  She denies any numbness tingling in the feet legs groin or buttock region.  Denies hitting her head or loss of consciousness.    PMH: History reviewed. No pertinent past medical history.    PSH: History reviewed. No pertinent surgical history.    FH: History reviewed. No pertinent family history.    SH:   Social History     Socioeconomic History    Marital status: Single     Spouse name: Not on file    Number of children: Not on file    Years of education: Not on file    Highest education level: Not on file   Occupational History    Not on file   Tobacco Use    Smoking status: Never    Smokeless tobacco: Never   Substance and Sexual Activity    Alcohol use: Not Currently    Drug use: Not Currently    Sexual activity: Not on file   Other Topics Concern    Not on file   Social History Narrative    Not on file     Social Determinants of Health     Financial Resource Strain: Not on file   Food Insecurity: Not on file   Transportation Needs: Not on file   Physical Activity: Not on file   Stress: Not on file   Social Connections: Not on file  "  Intimate Partner Violence: Not on file   Housing Stability: Not on file       ROS: In review of the following systems: Constitutional, Eyes, ENT, Cardiovascular,Respiratory, GI, , Musculoskeletal, Skin, Neuro, Psych, Hematologic, Endocrine, Allergic; no pertinent findings were found related to the chief complaint and orthopedic injury     /58   Pulse 97   Temp 36.6 °C (97.9 °F) (Temporal)   Resp 17   Ht 1.626 m (5' 4\")   Wt 56.7 kg (125 lb)   SpO2 98%     Physical Exam:  General: Well nourished, well developed, age appropriate appearance   HEENT: Normocephalic, atraumatic  Psych: Normal mood and affect  Neck: Supple, no pain to motion  Chest/Pulmonary: breathing unlabored, no audible wheezing  Cardio: Regular heart rate and rhythm  Neuro: Sensation grossly intact to BUE and BLE, moving all four extremities  Skin: Intact with no full thickness abrasions or lacerations  MSK: Intact sensation to bilateral feet both plantar and dorsally.  Full range of motion bilateral ankles and toes and no signs of motor deficit no tenderness to palpation at the thighs knees or legs.  Bilateral per extremities show pain-free active range of motion.    Imaging and labs: CT scan of the pelvis taken today was reviewed and read by myself.  This shows bilateral sacral fractures through zone 2.  The left is more of an impacted fracture with the right shows a more distinct fracture line.  There is an inferior ramus fracture as well as a pubic root fracture on the right side as well that are minimally displaced.    Recent Labs     12/26/22  1528   WBC 12.9*   RBC 3.73*   HEMOGLOBIN 11.9*   HEMATOCRIT 34.9*   MCV 93.6   MCH 31.9   RDW 40.9   PLATELETCT 218   MPV 10.6       Assessment:   1. Closed displaced fracture of right acetabulum, unspecified portion of acetabulum, initial encounter ([facility])        2. Closed fracture of sacrum, unspecified portion of sacrum, initial encounter ([facility])        3. Closed fracture of transverse " process of lumbar vertebra, initial encounter ([facility])        4. Closed fracture of ramus of right pubis, initial encounter ([facility])        5. Sledding accident            I discussed the diagnosis and findings with the patient at length.  I reviewed possible non operative and operative interventions and the risks and benefits of each of these.  I recommended fixation of the sacral fractures given the unstable nature of these fractures.  I described the procedure to her at length.  I reviewed her CT scans with her as well to help further describe the injuries to her.  She had a chance to ask questions and all of these were answered to her satisfaction.        Plan:  N.p.o. at midnight  Nonweightbearing bilateral lower extremities  Bedrest this evening, head of bed per patient comfort  OR tomorrow for percutaneous fixation of bilateral sacral fractures  Mobilize with therapy postoperatively  DVT prophylaxis with SCDs and Lovenox if no other contraindications

## 2022-12-27 NOTE — ASSESSMENT & PLAN NOTE
Prophylactic dose enoxaparin initiated upon admission.   12/29 Transition to Eliquis for DVT prevention in anticipation of discharge

## 2022-12-28 PROBLEM — E87.6 HYPOKALEMIA: Status: RESOLVED | Noted: 2022-12-27 | Resolved: 2022-12-28

## 2022-12-28 LAB
ANION GAP SERPL CALC-SCNC: 9 MMOL/L (ref 7–16)
BASOPHILS # BLD AUTO: 0.5 % (ref 0–1.8)
BASOPHILS # BLD: 0.03 K/UL (ref 0–0.12)
BUN SERPL-MCNC: 5 MG/DL (ref 8–22)
CALCIUM SERPL-MCNC: 9 MG/DL (ref 8.5–10.5)
CHLORIDE SERPL-SCNC: 104 MMOL/L (ref 96–112)
CO2 SERPL-SCNC: 26 MMOL/L (ref 20–33)
CREAT SERPL-MCNC: 0.41 MG/DL (ref 0.5–1.4)
EOSINOPHIL # BLD AUTO: 0.02 K/UL (ref 0–0.51)
EOSINOPHIL NFR BLD: 0.3 % (ref 0–6.9)
ERYTHROCYTE [DISTWIDTH] IN BLOOD BY AUTOMATED COUNT: 41.5 FL (ref 35.9–50)
GFR SERPLBLD CREATININE-BSD FMLA CKD-EPI: 142 ML/MIN/1.73 M 2
GLUCOSE SERPL-MCNC: 84 MG/DL (ref 65–99)
HCT VFR BLD AUTO: 29.9 % (ref 37–47)
HGB BLD-MCNC: 10.1 G/DL (ref 12–16)
IMM GRANULOCYTES # BLD AUTO: 0.03 K/UL (ref 0–0.11)
IMM GRANULOCYTES NFR BLD AUTO: 0.5 % (ref 0–0.9)
IRON SATN MFR SERPL: 16 % (ref 15–55)
IRON SERPL-MCNC: 52 UG/DL (ref 40–170)
LYMPHOCYTES # BLD AUTO: 0.96 K/UL (ref 1–4.8)
LYMPHOCYTES NFR BLD: 16 % (ref 22–41)
MCH RBC QN AUTO: 31.7 PG (ref 27–33)
MCHC RBC AUTO-ENTMCNC: 33.8 G/DL (ref 33.6–35)
MCV RBC AUTO: 93.7 FL (ref 81.4–97.8)
MONOCYTES # BLD AUTO: 0.46 K/UL (ref 0–0.85)
MONOCYTES NFR BLD AUTO: 7.7 % (ref 0–13.4)
NEUTROPHILS # BLD AUTO: 4.51 K/UL (ref 2–7.15)
NEUTROPHILS NFR BLD: 75 % (ref 44–72)
NRBC # BLD AUTO: 0 K/UL
NRBC BLD-RTO: 0 /100 WBC
PLATELET # BLD AUTO: 148 K/UL (ref 164–446)
PMV BLD AUTO: 11.1 FL (ref 9–12.9)
POTASSIUM SERPL-SCNC: 3.8 MMOL/L (ref 3.6–5.5)
RBC # BLD AUTO: 3.19 M/UL (ref 4.2–5.4)
SODIUM SERPL-SCNC: 139 MMOL/L (ref 135–145)
TIBC SERPL-MCNC: 320 UG/DL (ref 250–450)
UIBC SERPL-MCNC: 268 UG/DL (ref 110–370)
WBC # BLD AUTO: 6 K/UL (ref 4.8–10.8)

## 2022-12-28 PROCEDURE — 97535 SELF CARE MNGMENT TRAINING: CPT

## 2022-12-28 PROCEDURE — 700111 HCHG RX REV CODE 636 W/ 250 OVERRIDE (IP): Performed by: ORTHOPAEDIC SURGERY

## 2022-12-28 PROCEDURE — 83540 ASSAY OF IRON: CPT

## 2022-12-28 PROCEDURE — A9270 NON-COVERED ITEM OR SERVICE: HCPCS | Performed by: SURGERY

## 2022-12-28 PROCEDURE — 700101 HCHG RX REV CODE 250: Performed by: SURGERY

## 2022-12-28 PROCEDURE — 700111 HCHG RX REV CODE 636 W/ 250 OVERRIDE (IP): Performed by: SURGERY

## 2022-12-28 PROCEDURE — 97166 OT EVAL MOD COMPLEX 45 MIN: CPT

## 2022-12-28 PROCEDURE — 99233 SBSQ HOSP IP/OBS HIGH 50: CPT

## 2022-12-28 PROCEDURE — 83550 IRON BINDING TEST: CPT

## 2022-12-28 PROCEDURE — 700111 HCHG RX REV CODE 636 W/ 250 OVERRIDE (IP): Performed by: NURSE PRACTITIONER

## 2022-12-28 PROCEDURE — 99024 POSTOP FOLLOW-UP VISIT: CPT | Performed by: ORTHOPAEDIC SURGERY

## 2022-12-28 PROCEDURE — 700102 HCHG RX REV CODE 250 W/ 637 OVERRIDE(OP): Performed by: SURGERY

## 2022-12-28 PROCEDURE — 85025 COMPLETE CBC W/AUTO DIFF WBC: CPT

## 2022-12-28 PROCEDURE — 700102 HCHG RX REV CODE 250 W/ 637 OVERRIDE(OP): Performed by: NURSE PRACTITIONER

## 2022-12-28 PROCEDURE — 97162 PT EVAL MOD COMPLEX 30 MIN: CPT

## 2022-12-28 PROCEDURE — 770001 HCHG ROOM/CARE - MED/SURG/GYN PRIV*

## 2022-12-28 PROCEDURE — 700105 HCHG RX REV CODE 258: Performed by: ORTHOPAEDIC SURGERY

## 2022-12-28 PROCEDURE — A9270 NON-COVERED ITEM OR SERVICE: HCPCS | Performed by: NURSE PRACTITIONER

## 2022-12-28 PROCEDURE — 80048 BASIC METABOLIC PNL TOTAL CA: CPT

## 2022-12-28 RX ADMIN — ONDANSETRON 4 MG: 2 INJECTION INTRAMUSCULAR; INTRAVENOUS at 11:11

## 2022-12-28 RX ADMIN — ACETAMINOPHEN 650 MG: 325 TABLET ORAL at 22:36

## 2022-12-28 RX ADMIN — ACETAMINOPHEN 650 MG: 325 TABLET ORAL at 11:26

## 2022-12-28 RX ADMIN — GABAPENTIN 100 MG: 100 CAPSULE ORAL at 16:43

## 2022-12-28 RX ADMIN — OXYCODONE HYDROCHLORIDE 10 MG: 10 TABLET ORAL at 02:01

## 2022-12-28 RX ADMIN — METAXALONE 800 MG: 800 TABLET ORAL at 11:26

## 2022-12-28 RX ADMIN — DOCUSATE SODIUM 100 MG: 100 CAPSULE, LIQUID FILLED ORAL at 06:17

## 2022-12-28 RX ADMIN — ACETAMINOPHEN 650 MG: 325 TABLET ORAL at 00:27

## 2022-12-28 RX ADMIN — OXYCODONE HYDROCHLORIDE 10 MG: 10 TABLET ORAL at 06:16

## 2022-12-28 RX ADMIN — ENOXAPARIN SODIUM 30 MG: 30 INJECTION SUBCUTANEOUS at 16:43

## 2022-12-28 RX ADMIN — METAXALONE 800 MG: 800 TABLET ORAL at 06:17

## 2022-12-28 RX ADMIN — POLYETHYLENE GLYCOL 3350 1 PACKET: 17 POWDER, FOR SOLUTION ORAL at 06:15

## 2022-12-28 RX ADMIN — GABAPENTIN 100 MG: 100 CAPSULE ORAL at 11:26

## 2022-12-28 RX ADMIN — LIDOCAINE 1 PATCH: 50 PATCH CUTANEOUS at 06:19

## 2022-12-28 RX ADMIN — CEFAZOLIN 2 G: 2 INJECTION, POWDER, FOR SOLUTION INTRAMUSCULAR; INTRAVENOUS at 14:15

## 2022-12-28 RX ADMIN — SENNOSIDES AND DOCUSATE SODIUM 1 TABLET: 50; 8.6 TABLET ORAL at 22:36

## 2022-12-28 RX ADMIN — DOCUSATE SODIUM 100 MG: 100 CAPSULE, LIQUID FILLED ORAL at 16:43

## 2022-12-28 RX ADMIN — HYDROMORPHONE HYDROCHLORIDE 1 MG: 1 INJECTION, SOLUTION INTRAMUSCULAR; INTRAVENOUS; SUBCUTANEOUS at 02:54

## 2022-12-28 RX ADMIN — ONDANSETRON 4 MG: 2 INJECTION INTRAMUSCULAR; INTRAVENOUS at 00:23

## 2022-12-28 RX ADMIN — CEFAZOLIN 2 G: 2 INJECTION, POWDER, FOR SOLUTION INTRAMUSCULAR; INTRAVENOUS at 06:19

## 2022-12-28 RX ADMIN — ACETAMINOPHEN 650 MG: 325 TABLET ORAL at 06:16

## 2022-12-28 RX ADMIN — METAXALONE 800 MG: 800 TABLET ORAL at 16:43

## 2022-12-28 RX ADMIN — ONDANSETRON 4 MG: 2 INJECTION INTRAMUSCULAR; INTRAVENOUS at 06:02

## 2022-12-28 RX ADMIN — CELECOXIB 200 MG: 200 CAPSULE ORAL at 16:43

## 2022-12-28 RX ADMIN — ACETAMINOPHEN 650 MG: 325 TABLET ORAL at 16:43

## 2022-12-28 RX ADMIN — POLYETHYLENE GLYCOL 3350 1 PACKET: 17 POWDER, FOR SOLUTION ORAL at 16:44

## 2022-12-28 RX ADMIN — GABAPENTIN 100 MG: 100 CAPSULE ORAL at 02:01

## 2022-12-28 RX ADMIN — ENOXAPARIN SODIUM 30 MG: 30 INJECTION SUBCUTANEOUS at 06:17

## 2022-12-28 RX ADMIN — CELECOXIB 200 MG: 200 CAPSULE ORAL at 06:16

## 2022-12-28 RX ADMIN — OXYCODONE HYDROCHLORIDE 10 MG: 10 TABLET ORAL at 12:34

## 2022-12-28 RX ADMIN — OXYCODONE 5 MG: 5 TABLET ORAL at 22:41

## 2022-12-28 ASSESSMENT — ENCOUNTER SYMPTOMS
NEUROLOGICAL NEGATIVE: 1
EYES NEGATIVE: 1
NAUSEA: 1
DIAPHORESIS: 0
ABDOMINAL PAIN: 0
SHORTNESS OF BREATH: 0
CHILLS: 0
MYALGIAS: 1
VOMITING: 0
FEVER: 0
ROS GI COMMENTS: LAST BM BEFORE ADMIT

## 2022-12-28 ASSESSMENT — COGNITIVE AND FUNCTIONAL STATUS - GENERAL
STANDING UP FROM CHAIR USING ARMS: A LITTLE
SUGGESTED CMS G CODE MODIFIER MOBILITY: CL
HELP NEEDED FOR BATHING: A LITTLE
DRESSING REGULAR LOWER BODY CLOTHING: A LITTLE
WALKING IN HOSPITAL ROOM: TOTAL
SUGGESTED CMS G CODE MODIFIER DAILY ACTIVITY: CJ
TOILETING: A LITTLE
DAILY ACTIVITIY SCORE: 21
CLIMB 3 TO 5 STEPS WITH RAILING: TOTAL
MOVING TO AND FROM BED TO CHAIR: A LOT
MOVING FROM LYING ON BACK TO SITTING ON SIDE OF FLAT BED: A LOT
TURNING FROM BACK TO SIDE WHILE IN FLAT BAD: A LOT
MOBILITY SCORE: 11

## 2022-12-28 ASSESSMENT — ACTIVITIES OF DAILY LIVING (ADL): TOILETING: INDEPENDENT

## 2022-12-28 ASSESSMENT — GAIT ASSESSMENTS: GAIT LEVEL OF ASSIST: UNABLE TO PARTICIPATE

## 2022-12-28 NOTE — THERAPY
Occupational Therapy   Initial Evaluation     Patient Name: Yohana Alvarez  Age:  22 y.o., Sex:  female  Medical Record #: [id number]  Today's Date: 12/28/2022     Precautions  Precautions: Fall Risk, Toe Touch Weight Bearing Right Lower Extremity  Comments: Pivot LLE    Assessment  Patient is 22 y.o. female with a diagnosis of pelvic Fx.   Pt currently limited by decreased functional mobility, activity tolerance, balance, and pain which are affecting pt's ability to complete ADLs/IADLs at baseline. Pt would benefit from OT services in the acute care setting to maximize functional recovery.       Plan     Pt to be seen 3x/wk to address the above deficts.       Discharge Recommendations: (P) Anticipate that the patient will have no further occupational therapy needs after discharge from the hospital        12/28/22 1158   Prior Living Situation   Prior Services Home-Independent   Housing / Facility 2 Story Apartment / Condo   Elevator Yes   Bathroom Set up Bathtub / Shower Combination   Equipment Owned None   Lives with - Patient's Self Care Capacity Parents;Sibling   Prior Level of ADL Function   Self Feeding Independent   Grooming / Hygiene Independent   Bathing Independent   Dressing Independent   Toileting Independent   ADL Assessment   Grooming Supervision   Upper Body Dressing Supervision   Lower Body Dressing Minimal Assist   Functional Mobility   Sit to Stand Unable to Participate   Bed, Chair, Wheelchair Transfer Standby Assist   Short Term Goals   Short Term Goal # 1 supervised with LB dressing   Short Term Goal # 2 supervised with ADL txfs   Anticipated Discharge Equipment and Recommendations   Discharge Recommendations Anticipate that the patient will have no further occupational therapy needs after discharge from the hospital

## 2022-12-28 NOTE — PROGRESS NOTES
4 Eyes Skin Assessment Completed by Neftaly RN and Ritu RN.    Head WDL  Ears WDL  Nose WDL  Mouth WDL  Neck WDL  Breast/Chest WDL  Shoulder Blades WDL  Spine WDL  (R) Arm/Elbow/Hand WDL  (L) Arm/Elbow/Hand WDL  Abdomen x2 Incision sites covered w Gauze/ Tegaderm, Saturated  Groin WDL  Scrotum/Coccyx/Buttocks WDL  (R) Leg WDL  (L) Leg WDL  (R) Heel/Foot/Toe WDL  (L) Heel/Foot/Toe WDL          Devices In Places Blood Pressure Cuff, Pulse Ox, and SCD's      Interventions In Place Gray Ear Foams, Pillows, Heels Loaded W/Pillows, and Pressure Redistribution Mattress    Possible Skin Injury No    Pictures Uploaded Into Epic N/A  Wound Consult Placed N/A  RN Wound Prevention Protocol Ordered No

## 2022-12-28 NOTE — PROGRESS NOTES
Assumed care of patient at 1445. Bedside report received. Assessment complete.  AA&Ox4. Denies CP/SOB.  Reporting 0/10 pain. Declined intervention at this time.  Educated patient regarding pharmacologic and non pharmacologic modalities for pain management.  Skin per flowsheets  Tolerating regular vegan diet. Reporting slight nausea  + void. Last BM PTA   Pt ambulates LLE Pivot, RLE TTWB  All needs met at this time. Call light within reach. Pt calls appropriately. Bed low and locked, non skid socks in place. Hourly rounding in place.

## 2022-12-28 NOTE — PROGRESS NOTES
4 Eyes Skin Assessment Completed by ARACELI James and ARACELI Mccallum.    Head WDL  Ears WDL  Nose WDL  Mouth WDL  Neck WDL  Breast/Chest WDL  Shoulder Blades WDL  Spine WDL  (R) Arm/Elbow/Hand WDL  (L) Arm/Elbow/Hand WDL  Abdomen WDL  Groin WDL  Scrotum/Coccyx/Buttocks WDL  (R) Leg Incision hip  (L) Leg Incision hipx2  (R) Heel/Foot/Toe WDL  (L) Heel/Foot/Toe WDL          Devices In Places Tele Box, Pulse Ox, and SCD's      Interventions In Place Pressure Redistribution Mattress    Possible Skin Injury No    Pictures Uploaded Into Epic N/A  Wound Consult Placed N/A  RN Wound Prevention Protocol Ordered No

## 2022-12-28 NOTE — CARE PLAN
The patient is Stable - Low risk of patient condition declining or worsening    Shift Goals  Clinical Goals: Pain control, rest  Patient Goals: Rest, pain control  Family Goals: GLENN      Problem: Knowledge Deficit - Standard  Goal: Patient and family/care givers will demonstrate understanding of plan of care, disease process/condition, diagnostic tests and medications  Outcome: Progressing     Problem: Pain - Standard  Goal: Alleviation of pain or a reduction in pain to the patient’s comfort goal  Outcome: Progressing     Problem: Skin Integrity  Goal: Skin integrity is maintained or improved  Outcome: Progressing

## 2022-12-28 NOTE — CARE PLAN
Problem: Knowledge Deficit - Standard  Goal: Patient and family/care givers will demonstrate understanding of plan of care, disease process/condition, diagnostic tests and medications  Outcome: Progressing     Problem: Pain - Standard  Goal: Alleviation of pain or a reduction in pain to the patient’s comfort goal  Outcome: Progressing     Problem: Skin Integrity  Goal: Skin integrity is maintained or improved  Outcome: Progressing   The patient is Stable - Low risk of patient condition declining or worsening    Shift Goals  Clinical Goals: Pain Control  Patient Goals: Pain Control  Family Goals: N/A    Progress made toward(s) clinical / shift goals:  Pain controlled per MAR, patient turns self in bed, educated patient on plan of care this shift     Patient is not progressing towards the following goals:

## 2022-12-28 NOTE — PROGRESS NOTES
Ortho Progress Note    S: ESDRAS. Pain is well controlled. Denies any numbness or tingling to the BLE. All questions answered about surgery.       O:  Vitals:    12/28/22 0600   BP: 114/67   Pulse: 84   Resp: (!) 31   Temp:    SpO2: 97%          Physical Exam:  Gen: Aox3  Resp: Stable on room air, unlabored respirations        BLE  Dressings to bilateral gluteus/buttock with small amount serosanguinous  soak through  Compartments s/c  Intact EHL and FHL function  Sensation present to light touch to the SP, DP, and Tibial Nerve distribution  Brisk capillary refill present to all toes    Recent Labs     12/27/22  0630 12/27/22  1000 12/28/22  0609   WBC 10.3 9.6 6.0   RBC 3.34* 3.26* 3.19*   HEMOGLOBIN 10.5* 10.4* 10.1*   HEMATOCRIT 31.3* 30.5* 29.9*   MCV 93.7 93.6 93.7   MCH 31.4 31.9 31.7   RDW 41.5 41.4 41.5   PLATELETCT 155* 147* 148*   MPV 10.7 10.8 11.1   NEUTSPOLYS 82.30*  --  75.00*   LYMPHOCYTES 10.80*  --  16.00*   MONOCYTES 5.70  --  7.70   EOSINOPHILS 0.00  --  0.30   BASOPHILS 0.40  --  0.50       Recent Labs     12/26/22  1528 12/27/22  0630 12/28/22  0609   SODIUM 138 133* 139   POTASSIUM 3.2* 3.3* 3.8   CHLORIDE 103 101 104   CO2 21 23 26   GLUCOSE 104* 112* 84   BUN 17 10 5*         Assessment: 22 y.o. female s/p   PREOPERATIVE DIAGNOSIS: 1.  Right sacral fracture  2.  Left sacral fracture     POSTOPERATIVE DIAGNOSIS: Same     PROCEDURE PERFORMED: 1.  Left S1 sacroiliac screw 2.  Right S1 sacroiliac screw 3.  Transiliac transsacral screw S2         Plan:  Weight bearing status: TTWB RLE, Pivots only on the LLE.  Expect 6 weeks non ambulatory  PT consulted  DVT ppx: lovenox    Dispo:   OK for d/c from Ortho standpoint after cleared by PT      Follow up with Dr. Mensah at the [facility] in 2 weeks

## 2022-12-28 NOTE — THERAPY
Physical Therapy   Initial Evaluation     Patient Name: Yohana Alvarez  Age:  22 y.o., Sex:  female  Medical Record #: [id number]  Today's Date: 12/28/2022     Precautions  Precautions: Fall Risk;Toe Touch Weight Bearing Right Lower Extremity  Comments: Pivot LLE    Assessment  Patient is 22 y.o. female injured after crashing a sled into a concrete wall, found to have multiple pelvic fractures, L2-4 R transverse process fractures, and R 11th rib fracture.  Patient now POD #1 L S1, R S1, and transiliac transsacral screw.  Today patient was seen for PT evaluation, presented with pain, impaired balance and activity tolerance which negatively impact functional mobility.  BLEs strong, full formal resistive testing limited by pelvic and back pain.  Demonstrated transfer EOB <> chair for patient with explanation of sequencing.  Patient demonstrated squat pivot transfer x 3 with close CGA for safety.  Educated patient on use of WC, WB precautions, car transfer, positioning & cushioning for comfort.  Patient lives in 2 story apartment with her family, she can use the elevator to access 1st floor where her room & bathroom are.  Recommend wheelchair and likely shower chair for DC.  At this time recommend home health PT however may progress have no post acute PT needs until cleared by surgeon for ambulation.    Plan    Physical Therapy Initial Treatment Plan   Treatment Plan : Bed Mobility, Equipment, Neuro Re-Education / Balance, Self Care / Home Evaluation, Therapeutic Activities, Therapeutic Exercise  Treatment Frequency: 4 Times per Week  Duration: Until Therapy Goals Met    DC Equipment Recommendations: Wheelchair  Discharge Recommendations: Recommend home health for continued physical therapy services (vs no needs)     Objective     12/28/22 1159   Precautions   Precautions Fall Risk;Toe Touch Weight Bearing Right Lower Extremity   Comments Pivot LLE   Prior Living Situation   Prior Services Home-Independent   Housing /  Facility 2 Story Apartment / Condo   Elevator Yes   Bathroom Set up Bathtub / Shower Combination   Equipment Owned None   Lives with - Patient's Self Care Capacity Parents;Sibling   Prior Level of Functional Mobility   Bed Mobility Independent   Transfer Status Independent   Ambulation Independent   Distance Ambulation (Feet) (community ambulator)   Assistive Devices Used None   Stairs Independent   Cognition    Cognition / Consciousness WDL   Level of Consciousness Alert   Comments Very pleasant & receptive to education   Active ROM Lower Body    Active ROM Lower Body  X   Comments B ankles & knees WFL, hips NT   Strength Lower Body   Lower Body Strength  X   Comments B ankle DF 5/5, knee ext at least 3+/5, resistance limited by pain   Sensation Lower Body   Lower Extremity Sensation   WDL   Comments Denied numbness/tingling   Other Treatments   Other Treatments Provided Educated pt on use of WC, car transfer, positioning in car, and use of waffle cushion vs pillow in WC for comfort.  Demonstrated transfer EOB <> chair   Balance Assessment   Sitting Balance (Static) Good   Sitting Balance (Dynamic) Good   Weight Shift Sitting Fair   Bed Mobility    Supine to Sit Standby Assist   Sit to Supine (NT, left up in chair)   Scooting Standby Assist   Gait Analysis   Gait Level Of Assist Unable to Participate   Functional Mobility   Sit to Stand Unable to Participate   Bed, Chair, Wheelchair Transfer Contact Guard Assist   Transfer Method Squat Pivot   Mobility supine > EOB > chair > EOB > chair   Comments close CGA for safety, cues for RLE TTWB   Activity Tolerance   Sitting in Chair Post session   Sitting Edge of Bed 8 min +   Short Term Goals    Short Term Goal # 1 Pt will perform squat pivot transfer to WC with SPV in 6 visits to progress functional mobility   Short Term Goal # 2 Pt will propel  ft with SPV in 6 visits to progress mobility   Physical Therapy Initial Treatment Plan    Treatment Plan  Bed  Mobility;Equipment;Neuro Re-Education / Balance;Self Care / Home Evaluation;Therapeutic Activities;Therapeutic Exercise   Treatment Frequency 4 Times per Week   Duration Until Therapy Goals Met   Problem List    Problems Pain;Impaired Transfers;Impaired Balance;Decreased Activity Tolerance   Anticipated Discharge Equipment and Recommendations   DC Equipment Recommendations Wheelchair   Discharge Recommendations Recommend home health for continued physical therapy services (vs no needs)

## 2022-12-28 NOTE — PROGRESS NOTES
Trauma / Surgical Daily Progress Note    Date of Service  12/28/2022    Chief Complaint  22 y.o. female admitted 12/26/2022 with pelvic fractures, right 11th rib fracture, and lumbar transverse process fractures after sustaining a sledding crash.    POD 1 Bilateral sacroiliac screw fixation.    Interval Events  Doing well postop day 1 from pelvic surgery.  Cleared by orthopedics to mobilize with PT & OT with weightbearing precautions.  Rehab consulted pending.  Resolved hypokalemia.    - Transfer to ortho trauma white  - Nursing to titrate down oxygen with goal to sustain room air (patient's baseline)  - Mobilize with PT & OT for discharge recs  - Disposition: pending PT & OT recs, cleared by orthopedics for discharge    Review of Systems  Review of Systems   Constitutional:  Positive for malaise/fatigue. Negative for chills, diaphoresis and fever.   HENT: Negative.     Eyes: Negative.    Respiratory:  Negative for shortness of breath.    Cardiovascular:  Negative for chest pain.   Gastrointestinal:  Positive for nausea. Negative for abdominal pain and vomiting.        Last BM before admit   Genitourinary: Negative.         Voiding   Musculoskeletal:  Positive for myalgias (pelvis).   Skin: Negative.    Neurological: Negative.       Vital Signs  Temp:  [36.2 °C (97.1 °F)-37.8 °C (100.1 °F)] 36.5 °C (97.7 °F)  Pulse:  [] 85  Resp:  [9-40] 16  BP: ()/(50-80) 111/65  SpO2:  [91 %-99 %] 98 %    Physical Exam  Physical Exam  Vitals reviewed.   Constitutional:       General: She is not in acute distress.  HENT:      Head: Normocephalic and atraumatic.      Right Ear: External ear normal.      Left Ear: External ear normal.      Nose: Nose normal.      Mouth/Throat:      Mouth: Mucous membranes are moist.      Pharynx: Oropharynx is clear.   Eyes:      Pupils: Pupils are equal, round, and reactive to light.   Cardiovascular:      Rate and Rhythm: Normal rate and regular rhythm.      Pulses: Normal pulses.       Heart sounds: Normal heart sounds.   Pulmonary:      Effort: Pulmonary effort is normal. No respiratory distress.      Breath sounds: Normal breath sounds.   Chest:      Chest wall: No tenderness.   Abdominal:      General: Bowel sounds are normal. There is no distension.      Palpations: Abdomen is soft.      Tenderness: There is no abdominal tenderness.   Musculoskeletal:         General: Tenderness (bilateral hips) present.      Right lower leg: No edema.      Left lower leg: No edema.      Comments: Bilateral hip dressings intact with old serosanguinous drainage.   Skin:     General: Skin is warm and dry.   Neurological:      General: No focal deficit present.      Mental Status: She is alert and oriented to person, place, and time. Mental status is at baseline.      GCS: GCS eye subscore is 4. GCS verbal subscore is 5. GCS motor subscore is 6.      Sensory: No sensory deficit.   Psychiatric:         Mood and Affect: Mood normal.         Behavior: Behavior normal.       Laboratory  Recent Results (from the past 24 hour(s))   CBC WITHOUT DIFFERENTIAL    Collection Time: 12/27/22 10:00 AM   Result Value Ref Range    WBC 9.6 4.8 - 10.8 K/uL    RBC 3.26 (L) 4.20 - 5.40 M/uL    Hemoglobin 10.4 (L) 12.0 - 16.0 g/dL    Hematocrit 30.5 (L) 37.0 - 47.0 %    MCV 93.6 81.4 - 97.8 fL    MCH 31.9 27.0 - 33.0 pg    MCHC 34.1 33.6 - 35.0 g/dL    RDW 41.4 35.9 - 50.0 fL    Platelet Count 147 (L) 164 - 446 K/uL    MPV 10.8 9.0 - 12.9 fL   RETICULOCYTES COUNT    Collection Time: 12/27/22 10:00 AM   Result Value Ref Range    Reticulocyte Count 1.2 0.8 - 2.1 %    Retic, Absolute 0.04 0.04 - 0.06 M/uL    Imm. Reticulocyte Fraction 10.0 9.3 - 17.4 %    Retic Hgb Equivalent 36.5 (H) 29.0 - 35.0 pg/cell   IRON/TOTAL IRON BIND    Collection Time: 12/27/22 10:00 AM   Result Value Ref Range    Iron 63 40 - 170 ug/dL    Total Iron Binding 341 250 - 450 ug/dL    Unsat Iron Binding 278 110 - 370 ug/dL    % Saturation 18 15 - 55 %    MAGNESIUM    Collection Time: 12/27/22 10:00 AM   Result Value Ref Range    Magnesium 1.7 1.5 - 2.5 mg/dL   PHOSPHORUS    Collection Time: 12/27/22 10:00 AM   Result Value Ref Range    Phosphorus 3.1 2.5 - 4.5 mg/dL   CBC WITH DIFFERENTIAL    Collection Time: 12/28/22  6:09 AM   Result Value Ref Range    WBC 6.0 4.8 - 10.8 K/uL    RBC 3.19 (L) 4.20 - 5.40 M/uL    Hemoglobin 10.1 (L) 12.0 - 16.0 g/dL    Hematocrit 29.9 (L) 37.0 - 47.0 %    MCV 93.7 81.4 - 97.8 fL    MCH 31.7 27.0 - 33.0 pg    MCHC 33.8 33.6 - 35.0 g/dL    RDW 41.5 35.9 - 50.0 fL    Platelet Count 148 (L) 164 - 446 K/uL    MPV 11.1 9.0 - 12.9 fL    Neutrophils-Polys 75.00 (H) 44.00 - 72.00 %    Lymphocytes 16.00 (L) 22.00 - 41.00 %    Monocytes 7.70 0.00 - 13.40 %    Eosinophils 0.30 0.00 - 6.90 %    Basophils 0.50 0.00 - 1.80 %    Immature Granulocytes 0.50 0.00 - 0.90 %    Nucleated RBC 0.00 /100 WBC    Neutrophils (Absolute) 4.51 2.00 - 7.15 K/uL    Lymphs (Absolute) 0.96 (L) 1.00 - 4.80 K/uL    Monos (Absolute) 0.46 0.00 - 0.85 K/uL    Eos (Absolute) 0.02 0.00 - 0.51 K/uL    Baso (Absolute) 0.03 0.00 - 0.12 K/uL    Immature Granulocytes (abs) 0.03 0.00 - 0.11 K/uL    NRBC (Absolute) 0.00 K/uL   Basic Metabolic Panel    Collection Time: 12/28/22  6:09 AM   Result Value Ref Range    Sodium 139 135 - 145 mmol/L    Potassium 3.8 3.6 - 5.5 mmol/L    Chloride 104 96 - 112 mmol/L    Co2 26 20 - 33 mmol/L    Glucose 84 65 - 99 mg/dL    Bun 5 (L) 8 - 22 mg/dL    Creatinine 0.41 (L) 0.50 - 1.40 mg/dL    Calcium 9.0 8.5 - 10.5 mg/dL    Anion Gap 9.0 7.0 - 16.0   IRON/TOTAL IRON BIND    Collection Time: 12/28/22  6:09 AM   Result Value Ref Range    Iron 52 40 - 170 ug/dL    Total Iron Binding 320 250 - 450 ug/dL    Unsat Iron Binding 268 110 - 370 ug/dL    % Saturation 16 15 - 55 %   ESTIMATED GFR    Collection Time: 12/28/22  6:09 AM   Result Value Ref Range    GFR (CKD-EPI) 142 >60 mL/min/1.73 m 2       Fluids    Intake/Output Summary (Last 24 hours) at  12/28/2022 0935  Last data filed at 12/28/2022 0800  Gross per 24 hour   Intake 3110.02 ml   Output 3470 ml   Net -359.98 ml       Core Measures & Quality Metrics  Labs reviewed and Medications reviewed  Singh catheter: No Singh      DVT Prophylaxis: Enoxaparin (Lovenox)  DVT prophylaxis - mechanical: SCDs  Ulcer prophylaxis: Not indicated      RAP Score Total: 6  CAGE Results: negative Blood Alcohol>0.08: no     Assessment/Plan  * Multiple fractures of pelvis without disruption of pelvic ring, initial encounter for closed fracture (HCC)- (present on admission)  Assessment & Plan  Acute comminuted displaced fractures of the bilateral sacral alar, right more than left.   Acute displaced fractures of the right inferior pubic ramus and right anterior acetabulum..  12/27 percutaneous fixation of bilateral sacral fractures.   Weight bearing status - Touch toe weightbearing RLE, Pivot LLE for 6 weeks.  Praneeth Goodwin MD. Orthopedic Surgeon. [facility].    Discharge planning issues- (present on admission)  Assessment & Plan  Date of admission: 12/26/2022.  12/28 Transfer orders from SICU.  12/27 Rehab referral pending.  Cleared for discharge: No.  Discharge delayed: No.  Discharge date: tbd.    Acute blood loss anemia- (present on admission)  Assessment & Plan  Iron replacement per pharmacy protocol.    Closed fracture of transverse process of lumbar vertebra (HCC)- (present on admission)  Assessment & Plan  Acute mildly displaced fractures of the right transverse processes of L1, L2, L3 and L4.  Multi-modal pain regimen.    Fracture of one rib of right side- (present on admission)  Assessment & Plan  Acute nondisplaced fracture of the right posterior 11th rib.  Aggressive pulmonary hygiene and multimodal pain management.    Trauma- (present on admission)  Assessment & Plan  Green Activation  Sledding crash into concrete wall    No contraindication to deep vein thrombosis (DVT) prophylaxis- (present on  admission)  Assessment & Plan  Prophylactic dose enoxaparin initiated upon admission.        Discussed patient condition with RN, Patient, and trauma surgery, Dr. Angel Jo.

## 2022-12-28 NOTE — ASSESSMENT & PLAN NOTE
Date of admission: 12/26/2022.  12/28 Transfer orders from SICU.  12/27 Rehab referral pending.  12/29 Rehab declined due to level of functioning  Cleared for discharge: Yes - Date: 12/29.  Discharge delayed: No.  Discharge date: tbd.

## 2022-12-28 NOTE — CARE PLAN
The patient is Stable - Low risk of patient condition declining or worsening    Shift Goals  Clinical Goals: pain control; rest  Patient Goals: pain control; rest  Family Goals: no family at bedside    Progress made toward(s) clinical / shift goals:    Problem: Knowledge Deficit - Standard  Goal: Patient and family/care givers will demonstrate understanding of plan of care, disease process/condition, diagnostic tests and medications  Outcome: Progressing     Problem: Pain - Standard  Goal: Alleviation of pain or a reduction in pain to the patient’s comfort goal  Outcome: Progressing     Problem: Skin Integrity  Goal: Skin integrity is maintained or improved  Outcome: Progressing     Problem: Fall Risk  Goal: Patient will remain free from falls  Outcome: Progressing       Patient is not progressing towards the following goals:

## 2022-12-29 ENCOUNTER — PHARMACY VISIT (OUTPATIENT)
Dept: PHARMACY | Facility: MEDICAL CENTER | Age: 22
End: 2022-12-29
Payer: COMMERCIAL

## 2022-12-29 LAB
ANION GAP SERPL CALC-SCNC: 10 MMOL/L (ref 7–16)
BASOPHILS # BLD AUTO: 0.4 % (ref 0–1.8)
BASOPHILS # BLD: 0.02 K/UL (ref 0–0.12)
BUN SERPL-MCNC: 7 MG/DL (ref 8–22)
CALCIUM SERPL-MCNC: 9.1 MG/DL (ref 8.5–10.5)
CHLORIDE SERPL-SCNC: 103 MMOL/L (ref 96–112)
CO2 SERPL-SCNC: 25 MMOL/L (ref 20–33)
CREAT SERPL-MCNC: 0.43 MG/DL (ref 0.5–1.4)
EOSINOPHIL # BLD AUTO: 0.03 K/UL (ref 0–0.51)
EOSINOPHIL NFR BLD: 0.6 % (ref 0–6.9)
ERYTHROCYTE [DISTWIDTH] IN BLOOD BY AUTOMATED COUNT: 41.1 FL (ref 35.9–50)
GFR SERPLBLD CREATININE-BSD FMLA CKD-EPI: 140 ML/MIN/1.73 M 2
GLUCOSE SERPL-MCNC: 93 MG/DL (ref 65–99)
HCT VFR BLD AUTO: 28.7 % (ref 37–47)
HGB BLD-MCNC: 9.9 G/DL (ref 12–16)
IMM GRANULOCYTES # BLD AUTO: 0.03 K/UL (ref 0–0.11)
IMM GRANULOCYTES NFR BLD AUTO: 0.6 % (ref 0–0.9)
LYMPHOCYTES # BLD AUTO: 0.76 K/UL (ref 1–4.8)
LYMPHOCYTES NFR BLD: 14.9 % (ref 22–41)
MCH RBC QN AUTO: 31.9 PG (ref 27–33)
MCHC RBC AUTO-ENTMCNC: 34.5 G/DL (ref 33.6–35)
MCV RBC AUTO: 92.6 FL (ref 81.4–97.8)
MONOCYTES # BLD AUTO: 0.34 K/UL (ref 0–0.85)
MONOCYTES NFR BLD AUTO: 6.7 % (ref 0–13.4)
NEUTROPHILS # BLD AUTO: 3.91 K/UL (ref 2–7.15)
NEUTROPHILS NFR BLD: 76.8 % (ref 44–72)
NRBC # BLD AUTO: 0 K/UL
NRBC BLD-RTO: 0 /100 WBC
PLATELET # BLD AUTO: 160 K/UL (ref 164–446)
PMV BLD AUTO: 10.6 FL (ref 9–12.9)
POTASSIUM SERPL-SCNC: 3.8 MMOL/L (ref 3.6–5.5)
RBC # BLD AUTO: 3.1 M/UL (ref 4.2–5.4)
SODIUM SERPL-SCNC: 138 MMOL/L (ref 135–145)
WBC # BLD AUTO: 5.1 K/UL (ref 4.8–10.8)

## 2022-12-29 PROCEDURE — 700101 HCHG RX REV CODE 250: Performed by: SURGERY

## 2022-12-29 PROCEDURE — 85025 COMPLETE CBC W/AUTO DIFF WBC: CPT

## 2022-12-29 PROCEDURE — 99232 SBSQ HOSP IP/OBS MODERATE 35: CPT | Performed by: NURSE PRACTITIONER

## 2022-12-29 PROCEDURE — 700102 HCHG RX REV CODE 250 W/ 637 OVERRIDE(OP): Performed by: SURGERY

## 2022-12-29 PROCEDURE — 770001 HCHG ROOM/CARE - MED/SURG/GYN PRIV*

## 2022-12-29 PROCEDURE — 700102 HCHG RX REV CODE 250 W/ 637 OVERRIDE(OP): Performed by: NURSE PRACTITIONER

## 2022-12-29 PROCEDURE — A9270 NON-COVERED ITEM OR SERVICE: HCPCS | Performed by: SURGERY

## 2022-12-29 PROCEDURE — 80048 BASIC METABOLIC PNL TOTAL CA: CPT

## 2022-12-29 PROCEDURE — RXMED WILLOW AMBULATORY MEDICATION CHARGE: Performed by: NURSE PRACTITIONER

## 2022-12-29 PROCEDURE — 99223 1ST HOSP IP/OBS HIGH 75: CPT | Mod: 57 | Performed by: ORTHOPAEDIC SURGERY

## 2022-12-29 PROCEDURE — 700111 HCHG RX REV CODE 636 W/ 250 OVERRIDE (IP): Performed by: SURGERY

## 2022-12-29 PROCEDURE — A9270 NON-COVERED ITEM OR SERVICE: HCPCS | Performed by: NURSE PRACTITIONER

## 2022-12-29 RX ORDER — ACETAMINOPHEN 325 MG/1
650 TABLET ORAL 4 TIMES DAILY
Qty: 30 TABLET | Refills: 0 | COMMUNITY
Start: 2022-12-29

## 2022-12-29 RX ORDER — METAXALONE 800 MG/1
800 TABLET ORAL 3 TIMES DAILY
Qty: 42 TABLET | Refills: 0 | Status: SHIPPED | OUTPATIENT
Start: 2022-12-29 | End: 2023-01-12

## 2022-12-29 RX ORDER — POLYETHYLENE GLYCOL 3350 17 G/17G
17 POWDER, FOR SOLUTION ORAL 2 TIMES DAILY
Qty: 60 EACH | Refills: 0 | Status: SHIPPED | OUTPATIENT
Start: 2022-12-29 | End: 2023-01-28

## 2022-12-29 RX ORDER — OXYCODONE HYDROCHLORIDE 5 MG/1
5 TABLET ORAL
Qty: 30 TABLET | Refills: 0 | Status: SHIPPED | OUTPATIENT
Start: 2022-12-29 | End: 2023-01-05

## 2022-12-29 RX ORDER — AMOXICILLIN 250 MG
1 CAPSULE ORAL NIGHTLY
Qty: 30 TABLET | Refills: 0 | Status: SHIPPED | OUTPATIENT
Start: 2022-12-29 | End: 2023-01-28

## 2022-12-29 RX ORDER — LIDOCAINE 50 MG/G
1 PATCH TOPICAL EVERY 24 HOURS
Qty: 10 PATCH | Refills: 0 | Status: SHIPPED | OUTPATIENT
Start: 2022-12-30 | End: 2023-01-09

## 2022-12-29 RX ORDER — CELECOXIB 200 MG/1
200 CAPSULE ORAL 2 TIMES DAILY PRN
Qty: 30 CAPSULE | Refills: 0 | Status: SHIPPED | OUTPATIENT
Start: 2022-12-29

## 2022-12-29 RX ORDER — GABAPENTIN 100 MG/1
100 CAPSULE ORAL EVERY 8 HOURS
Qty: 42 CAPSULE | Refills: 0 | Status: SHIPPED | OUTPATIENT
Start: 2022-12-29 | End: 2023-01-12

## 2022-12-29 RX ORDER — PSEUDOEPHEDRINE HCL 30 MG
100 TABLET ORAL 2 TIMES DAILY
Qty: 60 CAPSULE | Refills: 0 | Status: SHIPPED | OUTPATIENT
Start: 2022-12-29 | End: 2023-01-28

## 2022-12-29 RX ADMIN — MAGNESIUM HYDROXIDE 30 ML: 400 SUSPENSION ORAL at 12:16

## 2022-12-29 RX ADMIN — CELECOXIB 200 MG: 200 CAPSULE ORAL at 06:05

## 2022-12-29 RX ADMIN — LIDOCAINE 1 PATCH: 50 PATCH CUTANEOUS at 06:05

## 2022-12-29 RX ADMIN — OXYCODONE HYDROCHLORIDE 10 MG: 10 TABLET ORAL at 08:53

## 2022-12-29 RX ADMIN — RIVAROXABAN 10 MG: 10 TABLET, FILM COATED ORAL at 17:33

## 2022-12-29 RX ADMIN — GABAPENTIN 100 MG: 100 CAPSULE ORAL at 17:33

## 2022-12-29 RX ADMIN — METAXALONE 800 MG: 800 TABLET ORAL at 06:04

## 2022-12-29 RX ADMIN — DOCUSATE SODIUM 100 MG: 100 CAPSULE, LIQUID FILLED ORAL at 06:05

## 2022-12-29 RX ADMIN — ACETAMINOPHEN 650 MG: 325 TABLET ORAL at 17:33

## 2022-12-29 RX ADMIN — OXYCODONE 5 MG: 5 TABLET ORAL at 19:50

## 2022-12-29 RX ADMIN — ONDANSETRON 4 MG: 4 TABLET, ORALLY DISINTEGRATING ORAL at 14:54

## 2022-12-29 RX ADMIN — ENOXAPARIN SODIUM 30 MG: 30 INJECTION SUBCUTANEOUS at 06:05

## 2022-12-29 RX ADMIN — ACETAMINOPHEN 650 MG: 325 TABLET ORAL at 23:35

## 2022-12-29 RX ADMIN — GABAPENTIN 100 MG: 100 CAPSULE ORAL at 08:53

## 2022-12-29 RX ADMIN — CELECOXIB 200 MG: 200 CAPSULE ORAL at 17:33

## 2022-12-29 RX ADMIN — GABAPENTIN 100 MG: 100 CAPSULE ORAL at 01:37

## 2022-12-29 RX ADMIN — POLYETHYLENE GLYCOL 3350 1 PACKET: 17 POWDER, FOR SOLUTION ORAL at 06:05

## 2022-12-29 ASSESSMENT — ENCOUNTER SYMPTOMS
VOMITING: 0
SPEECH CHANGE: 0
ABDOMINAL PAIN: 0
ROS GI COMMENTS: BM PRIOR TO ARRIVAL
FEVER: 0
CHILLS: 0
SENSORY CHANGE: 0
TINGLING: 0
NAUSEA: 0
DOUBLE VISION: 0
MYALGIAS: 1
HEADACHES: 0
COUGH: 0
SHORTNESS OF BREATH: 0
CONSTIPATION: 1
TREMORS: 0

## 2022-12-29 NOTE — PROGRESS NOTES
"    /74   Pulse 78   Temp 37.4 °C (99.3 °F) (Temporal)   Resp 15   Ht 1.626 m (5' 4\")   Wt 56.7 kg (125 lb)   SpO2 96%     Recent Labs     12/27/22  1000 12/28/22  0609 12/29/22  0822   WBC 9.6 6.0 5.1   RBC 3.26* 3.19* 3.10*   HEMOGLOBIN 10.4* 10.1* 9.9*   HEMATOCRIT 30.5* 29.9* 28.7*   MCV 93.6 93.7 92.6   MCH 31.9 31.7 31.9   MCHC 34.1 33.8 34.5   RDW 41.4 41.5 41.1   PLATELETCT 147* 148* 160*   MPV 10.8 11.1 10.6       POD#2  S/P ORIF Pelvis    Plan:  DVT Prophylaxis- TEDS/SCDs, lovenox while in hospital, ASA 81 mg PO BID as outpatient  Weight Bearing Status-TTWB RLE, [place] LLE  PT/OT  Antibiotics: None  Case Coordination           "

## 2022-12-29 NOTE — DISCHARGE PLANNING
Call to [facility], they are able to get auth from [facility] for WC and will need PT note, H&P, facesheet and order faxed to them at Fax: [phone].   Faxed order and all above documents to [facility].  Updated patient and her dad at bedside.   PCP: [facility] Charmaine, Tel: [phone]. [facility].   CM left voice message for call back at [facility].   12.45pm: Call from Flor at [facility], she advised that patients PCP is Dr Luba Drake and CM can fax notes to her at Fax: [phone].   Faxed H&P and PT notes to MD at above fax.   14.00 pm: Message from Nai at [facility], she is unable to fill WC order as patients insurance is out of network.    2.07pm: Spoke to Radha at [facility], they do not work with [employer] [place] of [place] and unable to fill order.

## 2022-12-29 NOTE — DISCHARGE PLANNING
[facility] Transitional Care Coordination    Referral from: Kristi HEARD  Insurance Provider on Facesheet: [employer]   Potential Rehab diagnosis: Trauma    Chart review indicates patient has ongoing medical management and therapy needs     D/C Support:  Parents    Physiatry referral will not be forwarded for consult.  Pelvic ring fractures, rib fracture, lumbar transverse process fractures.  Current documentation reflects limited therapy need for IRF level care.  Anticipate post acute services in-network with [facility] [employer] insurance.  Please reach out if PMR consult requested for medical management.      Last Covid test:    Thank you for the referral.

## 2022-12-29 NOTE — PROGRESS NOTES
"   Orthopaedic Progress Note    Interval changes:  Patient doing well   Dressings changed to bilateral pelvic incisions- no concerns noted  Cleared for DC home by ortho pending trauma and therapy clearance    ROS - Patient denies any new issues.  Pain well controlled.    /74   Pulse (!) 106   Temp 37.4 °C (99.3 °F) (Temporal)   Resp 15   Ht 1.626 m (5' 4\")   Wt 56.7 kg (125 lb)   SpO2 95%       Patient seen and examined  No acute distress  Breathing non labored  RRR  Bilateral pelvic dressings with min/mod sanguinous exudate, dressings changed, surgical incisions are well approximated and are dry and clean.  There is no erythema, induration, or signs of infection at any of the incision sites.  BLE DNVI, moves all toes, cap refill <2 sec.     Recent Labs     12/27/22  1000 12/28/22  0609 12/29/22  0822   WBC 9.6 6.0 5.1   RBC 3.26* 3.19* 3.10*   HEMOGLOBIN 10.4* 10.1* 9.9*   HEMATOCRIT 30.5* 29.9* 28.7*   MCV 93.6 93.7 92.6   MCH 31.9 31.7 31.9   MCHC 34.1 33.8 34.5   RDW 41.4 41.5 41.1   PLATELETCT 147* 148* 160*   MPV 10.8 11.1 10.6       Active Hospital Problems    Diagnosis     Discharge planning issues [Z02.9]      Priority: High     Patient lives in [place], [place].      Multiple fractures of pelvis without disruption of pelvic ring, initial encounter for closed fracture (HCC) [S32.82XA]      Priority: High    Acute blood loss anemia [D62]      Priority: Medium    Fracture of one rib of right side [S22.31XA]      Priority: Medium    Closed fracture of transverse process of lumbar vertebra (HCC) [S32.009A]      Priority: Medium    No contraindication to deep vein thrombosis (DVT) prophylaxis [Z78.9]      Priority: Low    Trauma [T14.90XA]      Priority: Low       Assessment/Plan:  Patient doing well   Dressings changed to bilateral pelvic incisions- no concerns noted  Cleared for DC home by ortho pending trauma and therapy clearance  POD#2 S/P   1.  Left S1 sacroiliac screw   2.  Right S1 sacroiliac " screw   3.  Transiliac transsacral screw S2   Wt bearing status - NWB RLE, pivot transfers only on left  Wound care/Drains - Dressings to be changed every other day by nursing  Future Procedures - none planned   Sutures/Staples out- 14 days post operatively  PT/OT-initiated  Antibiotics: Perioperative completed  DVT Prophylaxis- TEDS/SCDs/Foot pumps/xarelto  Singh-none  Case Coordination for Discharge Planning - Disposition home

## 2022-12-29 NOTE — THERAPY
PT Contact Note:    Based on review of chart and therapy notes, pt is TTWB R LE and only cleared for pivot WBing (for transfers) on L LE. Per PT evaluation, pt will require 16 inch, standard wheelchair for mobility in order to DC home with family.     PT will continue to follow while in house; however, pt is functionally capable of DC home with family assist when 1. Medically cleared, 2. WC has been obtained    Thanks     Amita Del Castillo, PT, DPT    [employer]te [id number]

## 2022-12-29 NOTE — DISCHARGE PLANNING
Case Management Discharge Planning    Admission Date: 12/26/2022  GMLOS: 5.2  ALOS: 3    6-Clicks ADL Score: 21  6-Clicks Mobility Score: 11  PT and/or OT Eval ordered: Yes  Post-acute Referrals Ordered: No   Post-acute Choice Obtained: No   Has referral(s) been sent to post-acute provider:  No      Anticipated Discharge Dispo: Discharge Disposition: Discharged to home/self care (01)    DME Needed: Yes    DME Ordered: No, has [employer] [place] coverage, needs WC.     Action(s) Taken: Met with patient at bedside, discussed plan for discharge home today, unable to order HH as [employer] [place] coverage, patient will need to follow up with her PCP when she gets home. Family may need to get WC from [facility] for DC home. Patient will need Lovenox for DC and will need to get this in [place]. CM left  for Rick Donovan regarding possible loaner WC.   CM will continue to monitor.       Escalations Completed: Leadership    Medically Clear: Yes    Next Steps:     Barriers to Discharge: Pending Insurance Authorization    Is the patient up for discharge tomorrow: Yes    Is transport arranged for discharge disposition: No

## 2022-12-29 NOTE — PROGRESS NOTES
Trauma / Surgical Daily Progress Note    Date of Service  12/29/2022    Chief Complaint  22 y.o. female admitted 12/26/2022 with pelvic fractures, right 11th rib fracture, and lumbar transverse process fractures after sustaining a sledding crash.     POD #2 Bilateral sacroiliac screw fixation.    Interval Events  Transfer from ICU to ortho/spine  Therapy notes reviewed    - DME and home health ordered  - Approaching discharge     Review of Systems  Review of Systems   Constitutional:  Negative for chills and fever.   Eyes:  Negative for double vision.   Respiratory:  Negative for cough and shortness of breath.    Cardiovascular:  Negative for chest pain.   Gastrointestinal:  Positive for constipation. Negative for abdominal pain, nausea and vomiting.        BM prior to arrival   Genitourinary:         Voiding    Musculoskeletal:  Positive for myalgias (pelvis).   Neurological:  Negative for tingling, tremors, sensory change, speech change and headaches.      Vital Signs  Temp:  [36.5 °C (97.7 °F)-37.4 °C (99.3 °F)] 37.4 °C (99.3 °F)  Pulse:  [66-99] 78  Resp:  [15-23] 15  BP: (100-116)/(58-74) 116/74  SpO2:  [95 %-98 %] 96 %    Physical Exam  Physical Exam  Vitals and nursing note reviewed.   Constitutional:       General: She is not in acute distress.     Comments: Up in chair   HENT:      Head: Normocephalic.      Right Ear: External ear normal.      Left Ear: External ear normal.      Nose: Nose normal.      Mouth/Throat:      Mouth: Mucous membranes are moist.      Pharynx: Oropharynx is clear.   Eyes:      Conjunctiva/sclera: Conjunctivae normal.   Cardiovascular:      Rate and Rhythm: Normal rate and regular rhythm.      Pulses: Normal pulses.   Pulmonary:      Effort: Pulmonary effort is normal. No respiratory distress.   Abdominal:      General: There is no distension.      Palpations: Abdomen is soft.      Tenderness: There is no abdominal tenderness. There is no guarding.   Musculoskeletal:          General: Tenderness (bilateral hips) present.      Right lower leg: No edema.      Left lower leg: No edema.      Comments: Bilateral hip dressings intact with old serosanguinous drainage   Skin:     General: Skin is warm and dry.   Neurological:      Mental Status: She is alert and oriented to person, place, and time.      Sensory: No sensory deficit.   Psychiatric:         Behavior: Behavior normal.       Laboratory  Recent Results (from the past 24 hour(s))   Basic Metabolic Panel    Collection Time: 12/29/22  8:22 AM   Result Value Ref Range    Sodium 138 135 - 145 mmol/L    Potassium 3.8 3.6 - 5.5 mmol/L    Chloride 103 96 - 112 mmol/L    Co2 25 20 - 33 mmol/L    Glucose 93 65 - 99 mg/dL    Bun 7 (L) 8 - 22 mg/dL    Creatinine 0.43 (L) 0.50 - 1.40 mg/dL    Calcium 9.1 8.5 - 10.5 mg/dL    Anion Gap 10.0 7.0 - 16.0   CBC WITH DIFFERENTIAL    Collection Time: 12/29/22  8:22 AM   Result Value Ref Range    WBC 5.1 4.8 - 10.8 K/uL    RBC 3.10 (L) 4.20 - 5.40 M/uL    Hemoglobin 9.9 (L) 12.0 - 16.0 g/dL    Hematocrit 28.7 (L) 37.0 - 47.0 %    MCV 92.6 81.4 - 97.8 fL    MCH 31.9 27.0 - 33.0 pg    MCHC 34.5 33.6 - 35.0 g/dL    RDW 41.1 35.9 - 50.0 fL    Platelet Count 160 (L) 164 - 446 K/uL    MPV 10.6 9.0 - 12.9 fL    Neutrophils-Polys 76.80 (H) 44.00 - 72.00 %    Lymphocytes 14.90 (L) 22.00 - 41.00 %    Monocytes 6.70 0.00 - 13.40 %    Eosinophils 0.60 0.00 - 6.90 %    Basophils 0.40 0.00 - 1.80 %    Immature Granulocytes 0.60 0.00 - 0.90 %    Nucleated RBC 0.00 /100 WBC    Neutrophils (Absolute) 3.91 2.00 - 7.15 K/uL    Lymphs (Absolute) 0.76 (L) 1.00 - 4.80 K/uL    Monos (Absolute) 0.34 0.00 - 0.85 K/uL    Eos (Absolute) 0.03 0.00 - 0.51 K/uL    Baso (Absolute) 0.02 0.00 - 0.12 K/uL    Immature Granulocytes (abs) 0.03 0.00 - 0.11 K/uL    NRBC (Absolute) 0.00 K/uL   ESTIMATED GFR    Collection Time: 12/29/22  8:22 AM   Result Value Ref Range    GFR (CKD-EPI) 140 >60 mL/min/1.73 m 2       Fluids    Intake/Output  Summary (Last 24 hours) at 12/29/2022 0949  Last data filed at 12/28/2022 1642  Gross per 24 hour   Intake 170.09 ml   Output 1550 ml   Net -1379.91 ml       Core Measures & Quality Metrics  Labs reviewed, Medications reviewed and Radiology images reviewed  Singh catheter: No Singh      DVT Prophylaxis: Enoxaparin (Lovenox)  DVT prophylaxis - mechanical: SCDs  Ulcer prophylaxis: Not indicated    Assessed for rehab: Patient was assess for and/or received rehabilitation services during this hospitalization  RAP Score Total: 6  CAGE Results: negative Blood Alcohol>0.08: no     Assessment/Plan  * Multiple fractures of pelvis without disruption of pelvic ring, initial encounter for closed fracture (HCC)- (present on admission)  Assessment & Plan  Acute comminuted displaced fractures of the bilateral sacral alar, right more than left.   Acute displaced fractures of the right inferior pubic ramus and right anterior acetabulum..  12/27 percutaneous fixation of bilateral sacral fractures.   Weight bearing status - Touch toe weightbearing RLE, Pivot LLE for 6 weeks.  Praneeth Goodwin MD. Orthopedic Surgeon. [facility].    Discharge planning issues- (present on admission)  Assessment & Plan  Date of admission: 12/26/2022.  12/28 Transfer orders from SICU.  12/27 Rehab referral pending.  12/29 Rehab declined due to level of functioning  Cleared for discharge: No.  Discharge delayed: No.  Discharge date: tbd.    Acute blood loss anemia- (present on admission)  Assessment & Plan  Iron replacement per pharmacy protocol.    Closed fracture of transverse process of lumbar vertebra (HCC)- (present on admission)  Assessment & Plan  Acute mildly displaced fractures of the right transverse processes of L1, L2, L3 and L4.  Multi-modal pain regimen.    Fracture of one rib of right side- (present on admission)  Assessment & Plan  Acute nondisplaced fracture of the right posterior 11th rib.  Aggressive pulmonary hygiene and  multimodal pain management.    Trauma- (present on admission)  Assessment & Plan  Sledding crash into concrete wall.  Trauma Green Activation.  Del Rick MD. Trauma Surgery.      No contraindication to deep vein thrombosis (DVT) prophylaxis- (present on admission)  Assessment & Plan  Prophylactic dose enoxaparin initiated upon admission.          Discussed patient condition with RN, , Patient, and trauma surgery, Dr. DANIELLE Jo.

## 2022-12-29 NOTE — CARE PLAN
The patient is Stable - Low risk of patient condition declining or worsening    Shift Goals  Clinical Goals: Pain control, IS  Patient Goals: Pain control, comfort  Family Goals: Not present    Progress made toward(s) clinical / shift goals:  Continuous interventions in place     Patient is not progressing towards the following goals:      Problem: Fall Risk  Goal: Patient will remain free from falls  Outcome: Progressing  Note: Bed in lowest position, locked, upper side rails up, treaded socks in place, using FWW, stand pivot transfers      Problem: Pain - Standard  Goal: Alleviation of pain or a reduction in pain to the patient’s comfort goal  Outcome: Progressing  Note: Educated about the pain scale and non pharmacological pain methods, check the MAR

## 2022-12-29 NOTE — FACE TO FACE
Face to Face Note  -  Durable Medical Equipment    JULIETH Wise - NPI: [id number]  I certify that this patient is under my care and that they have had a durable medical equipment(DME)face to face encounter by the nurse practitioner working collaboratively with me that meets the physician DME face-to-face encounter requirements with this patient on:    Date of encounter:   Patient:                    MRN:                       YOB: 2022  Yohana Alvarez  [id number]  2000     The encounter with the patient was in whole, or in part, for the following medical condition, which is the primary reason for durable medical equipment:  Other - post op, nonweight bearing    I certify that, based on my findings, the following durable medical equipment is medically necessary:  Wheelchair   Patient needs manual wheelchair for use inside the home based on the above diagnosis. Per guidelines patient meets criteria in the following ways:   A.  Patient has significant impairment in the following  mobility  and is is unable to complete these tasks in a reasonable timeframe.   B.  The patient's mobility limitations cannot be sufficiently resolved by use of  fitted cane or walker.   C.  The patient reports his home provides adequate access between rooms,  maneuvering space, and surfaces for use of the manual wheelchair that is  provided.   D.  The use of the manual wheelchair will significantly improve the patient's  ability to participate in MRADLs and the patient will use it on a regular basis in  the home.   E.  The patient has not expressed an unwillingness to use the manual  wheelchair.   F. The patient has limitations of strength, endurance, range of motion, or coordination per OT notes:.        ------------------------------------------------------------------------------------------------------------------    Face to Face Supporting Documentation - Home Health    The encounter with this patient  was in whole or in part the primary reason for home health admission.    Date of encounter:   Patient:                    MRN:                       YOB: 2022  Yohana Alvarez  [id number]  2000     Home health to see patient for:  Skilled Nursing care for assessment, interventions & education, Physical Therapy evaluation and treatment, and Occupational therapy evaluation and treatment    Skilled need for:  Surgical Aftercare bilateral sacral fractures    Skilled nursing interventions to include:  Comment: assessment    Homebound evidenced status by:  Need the aid of supportive devices such as crutches, canes, wheelchairs or walkers or Needs the assistance of another person in order to leave the home. Leaving home must require a considerable and taxing effort. There must exist a normal inability to leave the home.    Community Physician to provide follow up care: Pcp Pt States None     Optional Interventions    Wound information & treatment:    Home Infusion Therapy orders:    Line/Drain/Airway:    I certify the face to face encounter for this home care referral meets the CMS requirements and the encounter/clinical assessment with the patient was, in whole, or in part, for the medical condition(s) listed above, which is the primary reason for home health care. Based on my clinical findings: the service(s) are medically necessary, support the need for home health care, and the homebound criteria are met.  I certify that this patient has had a face to face encounter by the nurse practitioner working collaboratively with me.  SHANNAN Wise. - NPI: [id number]    *Debility, frailty and advanced age in the absence of an acute deterioration or exacerbation of a condition do not qualify a patient for home health.

## 2022-12-29 NOTE — CARE PLAN
The patient is Stable - Low risk of patient condition declining or worsening    Shift Goals  Clinical Goals: Pain control, IS  Patient Goals: Pain control, comfort  Family Goals: Charley present    Progress made toward(s) clinical / shift goals:  Patient educated on the pain scale and to notify RN if pain remains uncontrolled. Both pharmacological and nonpharmacological methods of pain alleviation discussed with patient. Patient verbalizes pain control at this time with PRN oxycodone and standing medications. Patient medicated per MAR.    Patient is not progressing towards the following goals:

## 2022-12-30 VITALS
SYSTOLIC BLOOD PRESSURE: 106 MMHG | WEIGHT: 125 LBS | HEIGHT: 64 IN | TEMPERATURE: 98.6 F | HEART RATE: 98 BPM | DIASTOLIC BLOOD PRESSURE: 73 MMHG | BODY MASS INDEX: 21.34 KG/M2 | OXYGEN SATURATION: 93 % | RESPIRATION RATE: 16 BRPM

## 2022-12-30 LAB
ANION GAP SERPL CALC-SCNC: 12 MMOL/L (ref 7–16)
BASOPHILS # BLD AUTO: 0.7 % (ref 0–1.8)
BASOPHILS # BLD: 0.04 K/UL (ref 0–0.12)
BUN SERPL-MCNC: 7 MG/DL (ref 8–22)
CALCIUM SERPL-MCNC: 9.6 MG/DL (ref 8.5–10.5)
CHLORIDE SERPL-SCNC: 102 MMOL/L (ref 96–112)
CO2 SERPL-SCNC: 23 MMOL/L (ref 20–33)
CREAT SERPL-MCNC: 0.46 MG/DL (ref 0.5–1.4)
EOSINOPHIL # BLD AUTO: 0.04 K/UL (ref 0–0.51)
EOSINOPHIL NFR BLD: 0.7 % (ref 0–6.9)
ERYTHROCYTE [DISTWIDTH] IN BLOOD BY AUTOMATED COUNT: 41.1 FL (ref 35.9–50)
GFR SERPLBLD CREATININE-BSD FMLA CKD-EPI: 138 ML/MIN/1.73 M 2
GLUCOSE SERPL-MCNC: 114 MG/DL (ref 65–99)
HCT VFR BLD AUTO: 30.5 % (ref 37–47)
HGB BLD-MCNC: 10.4 G/DL (ref 12–16)
IMM GRANULOCYTES # BLD AUTO: 0.03 K/UL (ref 0–0.11)
IMM GRANULOCYTES NFR BLD AUTO: 0.5 % (ref 0–0.9)
LYMPHOCYTES # BLD AUTO: 0.79 K/UL (ref 1–4.8)
LYMPHOCYTES NFR BLD: 13.9 % (ref 22–41)
MCH RBC QN AUTO: 31.7 PG (ref 27–33)
MCHC RBC AUTO-ENTMCNC: 34.1 G/DL (ref 33.6–35)
MCV RBC AUTO: 93 FL (ref 81.4–97.8)
MONOCYTES # BLD AUTO: 0.41 K/UL (ref 0–0.85)
MONOCYTES NFR BLD AUTO: 7.2 % (ref 0–13.4)
NEUTROPHILS # BLD AUTO: 4.39 K/UL (ref 2–7.15)
NEUTROPHILS NFR BLD: 77 % (ref 44–72)
NRBC # BLD AUTO: 0 K/UL
NRBC BLD-RTO: 0 /100 WBC
PLATELET # BLD AUTO: 190 K/UL (ref 164–446)
PMV BLD AUTO: 10.2 FL (ref 9–12.9)
POTASSIUM SERPL-SCNC: 3.8 MMOL/L (ref 3.6–5.5)
RBC # BLD AUTO: 3.28 M/UL (ref 4.2–5.4)
SODIUM SERPL-SCNC: 137 MMOL/L (ref 135–145)
WBC # BLD AUTO: 5.7 K/UL (ref 4.8–10.8)

## 2022-12-30 PROCEDURE — 80048 BASIC METABOLIC PNL TOTAL CA: CPT

## 2022-12-30 PROCEDURE — 700101 HCHG RX REV CODE 250: Performed by: SURGERY

## 2022-12-30 PROCEDURE — A9270 NON-COVERED ITEM OR SERVICE: HCPCS | Performed by: SURGERY

## 2022-12-30 PROCEDURE — A9270 NON-COVERED ITEM OR SERVICE: HCPCS | Performed by: NURSE PRACTITIONER

## 2022-12-30 PROCEDURE — 99024 POSTOP FOLLOW-UP VISIT: CPT | Performed by: ORTHOPAEDIC SURGERY

## 2022-12-30 PROCEDURE — 700102 HCHG RX REV CODE 250 W/ 637 OVERRIDE(OP): Performed by: SURGERY

## 2022-12-30 PROCEDURE — 85025 COMPLETE CBC W/AUTO DIFF WBC: CPT

## 2022-12-30 PROCEDURE — 99239 HOSP IP/OBS DSCHRG MGMT >30: CPT | Performed by: NURSE PRACTITIONER

## 2022-12-30 PROCEDURE — 700102 HCHG RX REV CODE 250 W/ 637 OVERRIDE(OP): Performed by: NURSE PRACTITIONER

## 2022-12-30 RX ADMIN — ACETAMINOPHEN 650 MG: 325 TABLET ORAL at 08:31

## 2022-12-30 RX ADMIN — OXYCODONE HYDROCHLORIDE 10 MG: 10 TABLET ORAL at 03:55

## 2022-12-30 RX ADMIN — CELECOXIB 200 MG: 200 CAPSULE ORAL at 08:30

## 2022-12-30 RX ADMIN — GABAPENTIN 100 MG: 100 CAPSULE ORAL at 10:28

## 2022-12-30 RX ADMIN — OXYCODONE 5 MG: 5 TABLET ORAL at 08:33

## 2022-12-30 RX ADMIN — METAXALONE 800 MG: 800 TABLET ORAL at 08:31

## 2022-12-30 RX ADMIN — GABAPENTIN 100 MG: 100 CAPSULE ORAL at 03:53

## 2022-12-30 RX ADMIN — LIDOCAINE 1 PATCH: 50 PATCH CUTANEOUS at 08:40

## 2022-12-30 RX ADMIN — METAXALONE 800 MG: 800 TABLET ORAL at 12:06

## 2022-12-30 NOTE — CARE PLAN
The patient is Stable - Low risk of patient condition declining or worsening    Shift Goals  Clinical Goals: Pain control, mobility  Patient Goals: comfort, rest  Family Goals: Not present    Progress made toward(s) clinical / shift goals:    Problem: Pain - Standard  Goal: Alleviation of pain or a reduction in pain to the patient’s comfort goal  12/29/2022 2007 by Romeo Carmona R.N.  Outcome: Progressing   Medicated per MAR with relief, non pharmacological comfort measure includes ice pack and repositioning.    Problem: Mobility  Goal: Patient's capacity to carry out activities will improve  Outcome: Progressing   Able to transfer to commode from bed and back with 1 person SBA - hand held. Able to maintain WB restrictions, tolerates activity well.    Patient is not progressing towards the following goals:

## 2022-12-30 NOTE — DISCHARGE SUMMARY
Trauma Discharge Summary    DATE OF ADMISSION: 12/26/2022    DATE OF DISCHARGE: 12/30/2022    LENGTH OF STAY: 4 days    ATTENDING PHYSICIAN: Del Howard M.D.    CONSULTING PHYSICIAN:   Amara. Praneeth Goodwin MD. Orthopedic surgery    DISCHARGE DIAGNOSIS:  Principal Problem:    Multiple fractures of pelvis without disruption of pelvic ring, initial encounter for closed fracture (HCC) POA: Yes  Active Problems:    Discharge planning issues POA: Yes      Overview: Patient lives in [place], [place].    Fracture of one rib of right side POA: Yes    Closed fracture of transverse process of lumbar vertebra (HCC) POA: Yes    Acute blood loss anemia POA: Yes    No contraindication to deep vein thrombosis (DVT) prophylaxis POA: Yes    Trauma POA: Yes  Resolved Problems:    Hypokalemia POA: Yes      PROCEDURES:  1. Procedure completed by Dr. Mensah on 12/27/2022: Left SI sacroiliac screw, right S1 sacroiliac screw, transiliac transsacral screw S2    HISTORY OF PRESENT ILLNESS: The patient is a 22 y.o. female who was reportedly injured in a sledding crash.  She was transferred to [facility] in [place], [place].    HOSPITAL COURSE: The patient was triaged as a consult activation. The patient was transported to the trauma intensive care unit.    Imaging demonstrated multiple pelvic fractures, orthopedics was consulted and the patient presented for operative repair as noted above.  Postoperatively she is toe-touch weightbearing to the right lower extremity and pivot weightbearing on the left lower extremity for 6 weeks.    She also sustained a fracture of the posterior 11th rib as well as transverse process fractures in the lumbar spine.  These injuries were managed nonoperatively.  She did receive multimodal pain management.    She transferred to the [facility]ospine [facility] and was evaluated by physical and occupational therapy and deemed a good candidate for outpatient therapy upon discharge.    On day of discharge her  pain is well controlled, she verbalizes understanding of her weightbearing restrictions, she has a wheelchair at bedside, she is tolerating a regular diet and having bowel movements.    HOSPITAL PROBLEM LIST:  * Multiple fractures of pelvis without disruption of pelvic ring, initial encounter for closed fracture (HCC)- (present on admission)  Assessment & Plan  Acute comminuted displaced fractures of the bilateral sacral alar, right more than left.   Acute displaced fractures of the right inferior pubic ramus and right anterior acetabulum..  12/27 percutaneous fixation of bilateral sacral fractures.   Weight bearing status - Touch toe weightbearing RLE, Pivot LLE for 6 weeks.  Praneeth Goodwin MD. Orthopedic Surgeon. [facility].     Discharge planning issues- (present on admission)  Assessment & Plan  Date of admission: 12/26/2022.  12/28 Transfer orders from SICU.  12/27 Rehab referral pending.  12/29 Rehab declined due to level of functioning  Cleared for discharge: Yes - Date: 12/29.  Discharge delayed: No.  Discharge date: tbd.    Acute blood loss anemia- (present on admission)  Assessment & Plan  Iron replacement per pharmacy protocol.    Closed fracture of transverse process of lumbar vertebra (HCC)- (present on admission)  Assessment & Plan  Acute mildly displaced fractures of the right transverse processes of L1, L2, L3 and L4.  Multi-modal pain regimen.    Fracture of one rib of right side- (present on admission)  Assessment & Plan  Acute nondisplaced fracture of the right posterior 11th rib.  Aggressive pulmonary hygiene and multimodal pain management.    Trauma- (present on admission)  Assessment & Plan  Sledding crash into concrete wall.  Trauma Green Activation.  Del Rick MD. Trauma Surgery.      No contraindication to deep vein thrombosis (DVT) prophylaxis- (present on admission)  Assessment & Plan  Prophylactic dose enoxaparin initiated upon admission.   12/29 Transition to [facility]  for DVT prevention in anticipation of discharge       Hypokalemia-resolved as of 12/28/2022, (present on admission)  Assessment & Plan  Replete and trend.  12/28 Normalized.        DISPOSITION: Discharged home with family on 12/30/2022. The patient and family were counseled and questions were answered. Specifically, signs and symptoms of infection, respiratory decompensation and persistent or worsening pain were discussed and the patient agrees to seek medical attention if any of these develop.    DISCHARGE MEDICATIONS:  The patients controlled substance history was reviewed and a controlled substance use informed consent (if applicable) was provided by [facility] and the patient has been prescribed.     Medication List        Start taking these medications        Instructions   acetaminophen 325 MG Tabs  Commonly known as: Tylenol   Take 2 Tablets by mouth 4 times a day.  Dose: 650 mg     celecoxib 200 MG Caps  Commonly known as: CELEBREX   Take 1 Capsule by mouth 2 times a day as needed for Moderate Pain.  Dose: 200 mg     docusate sodium 100 MG Caps  Commonly known as: COLACE   Take 1 capsule by mouth 2 times a day  Dose: 100 mg     gabapentin 100 MG Caps  Commonly known as: NEURONTIN   Take 1 Capsule by mouth every 8 hours for 14 days.  Dose: 100 mg     lidocaine 5 % Ptch  Commonly known as: LIDODERM   Place 1 Patch on the skin every 24 hours for 10 days (12 hours on, 12 hours off)  Dose: 1 Patch     magnesium hydroxide 400 MG/5ML Susp  Commonly known as: MILK OF MAGNESIA   Take 30 mL by mouth every day for 30 days.  Dose: 30 mL     metaxalone 800 MG Tabs  Commonly known as: Skelaxin   Take 1 Tablet by mouth 3 times a day for 14 days.  Dose: 800 mg     oxyCODONE immediate-release 5 MG Tabs  Commonly known as: ROXICODONE   Take 1 Tablet by mouth every 3 hours as needed for Severe Pain for up to 7 days.  Dose: 5 mg     polyethylene glycol/lytes 17 g Pack  Commonly known as: MIRALAX   Take 1  Packet by mouth 2 times a day for 30 days.  Dose: 17 g     Stimulant Laxative 8.6-50 MG Tabs  Generic drug: senna-docusate   Take 1 Tablet by mouth every evening for 30 days.  Dose: 1 Tablet     Xarelto 10 MG Tabs tablet  Generic drug: rivaroxaban   Take 1 Tablet by mouth every day at 6 PM for 20 days.  Dose: 10 mg            Continue taking these medications        Instructions   OMEGA-3 PO   Take 2 Capsules by mouth every evening.  Dose: 2 Capsule     VITAMIN D3 PO   Take 1 Capsule by mouth every evening.  Dose: 1 Capsule              ACTIVITY:  Toe tough weight bearing right lower extremity, pivot left lower extremity    WOUND CARE:  Staples to be removed at follow up appointment    DIET:  Orders Placed This Encounter   Procedures    Diet Order Diet: Regular (VEGAN)     Standing Status:   Standing     Number of Occurrences:   1     Order Specific Question:   Diet:     Answer:   Regular [1]     Comments:   VEGAN       FOLLOW UP:  Stephan Mensah M.D.  [address] [place] [address]  [phone]    Schedule an appointment as soon as possible for a visit in 2 week(s)  For wound re-check if unable to see local orthopedic surgeon    [address]  [place] [address]  [phone]    Call  Trauma follow up clinic as needed    local orthopedic surgeon    Schedule an appointment as soon as possible for a visit in 2 week(s)  For wound re-check      TIME SPENT ON DISCHARGE: 35 minutes      ____________________________________________  JULIETH Wise    DD: 12/30/2022 9:29 AM

## 2022-12-30 NOTE — PROGRESS NOTES
"DC planning issues    /73   Pulse 98   Temp 37 °C (98.6 °F) (Temporal)   Resp 16   Ht 1.626 m (5' 4\")   Wt 56.7 kg (125 lb)   SpO2 93%     Recent Labs     12/28/22  0609 12/29/22  0822 12/30/22  0416   WBC 6.0 5.1 5.7   RBC 3.19* 3.10* 3.28*   HEMOGLOBIN 10.1* 9.9* 10.4*   HEMATOCRIT 29.9* 28.7* 30.5*   MCV 93.7 92.6 93.0   MCH 31.7 31.9 31.7   MCHC 33.8 34.5 34.1   RDW 41.5 41.1 41.1   PLATELETCT 148* 160* 190   MPV 11.1 10.6 10.2       POD#3  S/P ORIF pelvis    Plan:  DVT Prophylaxis- TEDS/SCDs, lovenox while in hospital, ASA 81 mg PO BID as outpatient  Weight Bearing Status-TTWB RLE, [place] LLE  PT/OT  Antibiotics: none  Case Coordination           "

## 2022-12-30 NOTE — CARE PLAN
The patient is Stable - Low risk of patient condition declining or worsening    Shift Goals  Clinical Goals: safety, mobility, pain mgmt  Patient Goals: discharge  Family Goals: not present    Progress made toward(s) clinical / shift goals:    Problem: Fall Risk  Goal: Patient will remain free from falls  Outcome: Progressing  Note: Safety precautions are in place including bed locked and in lowest position, upper bed rails up, bed alarm on, call light within reach, treaded socks on, tray table and personal belongings within reach.        Patient is not progressing towards the following goals:

## 2022-12-30 NOTE — DISCHARGE INSTRUCTIONS
Pelvic Fracture  Pelvic fractures are usually due to a fall or car accident. Minor fractures of the pelvic bones can often be treated at home. Walking or changing positions may be painful. You should rest for several days but then begin weight bearing and walking as tolerated. Crutches or a walker are often used in the first few weeks to reduce pain and enable you to get around easier. Prolonged bed rest for a pelvic fracture is not recommended. It increases your risk for blood clots and other complications.  Pelvic fractures usually heal in 6-12 weeks without any special treatment. Treatment may include pain medicine, medicine to keep your stool soft, and crutches or a walker.   Take these simple measures to avoid further falls and injuries:  Get rid of your throw rugs and electrical cords from traveled areas.   Avoid poorly lit stairs.   Do not wear high heel shoes.   If you are at risk for osteoporosis, treatment includes regular exercise, a diet rich in calcium and vitamin D, and possibly other drugs to help preserve bone. Ask your primary care physician if you should have a bone density test (DEXA scan) if you are 50 years or older.  SEEK IMMEDIATE MEDICAL CARE IF:  You develop blood in your urine, increased pain, severe constipation, increasing difficulty walking, pain or unusual swelling in your legs, chest pain, fever, shortness of breath, or if you faint or fall.   MAKE SURE YOU:   Understand these instructions.   Will watch your condition.   Will get help right away if you are not doing well or get worse.   Document Released: 01/25/2006 Document Revised: 03/11/2013 Document Reviewed: 04/07/2010  [employer]® Patient Information ©2013 [employer].    Weight Bearing Status-TTWB RLE, [place] LLE

## 2022-12-30 NOTE — PROGRESS NOTES
Meds-to-Beds: Discharge prescription orders listed below delivered to patient's bedside. RN Joaquin notified. Patient counseled.      Current Outpatient Medications   Medication Sig Dispense Refill    acetaminophen (TYLENOL) 325 MG Tab Take 2 Tablets by mouth 4 times a day. 30 Tablet 0    celecoxib (CELEBREX) 200 MG Cap Take 1 Capsule by mouth 2 times a day as needed for Moderate Pain. 30 Capsule 0    gabapentin (NEURONTIN) 100 MG Cap Take 1 Capsule by mouth every 8 hours for 14 days. 42 Capsule 0    docusate sodium 100 MG Cap Take 1 capsule by mouth 2 times a day 60 Capsule 0    [START ON 12/30/2022] lidocaine (LIDODERM) 5 % Patch Place 1 Patch on the skin every 24 hours for 10 days (12 hours on, 12 hours off) 10 Patch 0    [START ON 12/30/2022] magnesium hydroxide (MILK OF MAGNESIA) 400 MG/5ML Suspension Take 30 mL by mouth every day for 30 days. 900 mL 0    metaxalone (SKELAXIN) 800 MG Tab Take 1 Tablet by mouth 3 times a day for 14 days. 42 Tablet 0    oxyCODONE immediate-release (ROXICODONE) 5 MG Tab Take 1 Tablet by mouth every 3 hours as needed for Severe Pain for up to 7 days. 30 Tablet 0    polyethylene glycol/lytes (MIRALAX) 17 g Pack Take 1 Packet by mouth 2 times a day for 30 days. 60 Each 0    rivaroxaban (XARELTO) 10 MG Tab tablet Take 1 Tablet by mouth every day at 6 PM for 20 days. 20 Tablet 0    senna-docusate (PERICOLACE OR SENOKOT S) 8.6-50 MG Tab Take 1 Tablet by mouth every evening for 30 days. 30 Tablet 0      Abdiel Carrizales, Pharmacy Intern

## 2022-12-30 NOTE — DISCHARGE PLANNING
Met with patient at bedside, [facility] and will [facility] today prior to discharge. CM faxed H&P and progress notes to PCP yesterday, Yohana is hoping to go to [place] for follow up appt once she gets home. Her parents are planning to stay with her in a hotel in [place] for a few days and will probably drive home to [place] on Monday.

## 2022-12-30 NOTE — DISCHARGE PLANNING
Attempted x2 to update patient regarding WC, Melisa unable to fill order due to insurance. Patient will need to purchase own WC. Patient on BSC and unable to discuss, ARACELI Nuñez will update patient. CM will follow up tomorrow.

## (undated) DEVICE — GUIDEPIN FOR 7.3MM CANNULATED SCREWS 2.8MM X 450MM (3TX6=18)(6EA/PK)

## (undated) DEVICE — GLOVE SZ 7 BIOGEL PI MICRO - PF LF (50PR/BX 4BX/CA)

## (undated) DEVICE — CANISTER SUCTION 3000ML MECHANICAL FILTER AUTO SHUTOFF MEDI-VAC NONSTERILE LF DISP  (40EA/CA)

## (undated) DEVICE — GLOVE BIOGEL PI INDICATOR SZ 8.0 SURGICAL PF LF -(50/BX 4BX/CA)

## (undated) DEVICE — GLOVE SZ 7.5 BIOGEL PI MICRO - PF LF (50PR/BX)

## (undated) DEVICE — PADDING CAST 4 IN STERILE - 4 X 4 YDS (24/CA)

## (undated) DEVICE — SUTURE GENERAL

## (undated) DEVICE — GLOVE BIOGEL PI INDICATOR SZ 7.5 SURGICAL PF LF -(50/BX 4BX/CA)

## (undated) DEVICE — TOWELS CLOTH SURGICAL - (4/PK 20PK/CA)

## (undated) DEVICE — DRESSING TRANSPARENT FILM TEGADERM 4 X 4.75" (50EA/BX)"

## (undated) DEVICE — TUBE E-T HI-LO CUFF 7.0MM (10EA/PK)

## (undated) DEVICE — CHLORAPREP 26 ML APPLICATOR - ORANGE TINT(25/CA)

## (undated) DEVICE — SET LEADWIRE 5 LEAD BEDSIDE DISPOSABLE ECG (1SET OF 5/EA)

## (undated) DEVICE — DRESSING PETROLEUM GAUZE 5 X 9" (50EA/BX 4BX/CA)"

## (undated) DEVICE — SPONGE GAUZESTER 4 X 4 4PLY - (128PK/CA)

## (undated) DEVICE — GLOVE BIOGEL SZ 8 SURGICAL PF LTX - (50PR/BX 4BX/CA)

## (undated) DEVICE — SUTURE 0 VICRYL PLUS CT-1 - 8 X 18 INCH (12/BX)

## (undated) DEVICE — STAPLER SKIN DISP - (6/BX 10BX/CA) VISISTAT

## (undated) DEVICE — SET EXTENSION WITH 2 PORTS (48EA/CA) ***PART #2C8610 IS A SUBSTITUTE*****

## (undated) DEVICE — SUTURE 2-0 VICRYL PLUS CT-1 - 8 X 18 INCH(12/BX)

## (undated) DEVICE — PADDING CAST 6 IN STERILE - 6 X 4 YDS (24/CA)

## (undated) DEVICE — DRAPE LARGE 3 QUARTER - (20/CA)

## (undated) DEVICE — GUIDEPIN FOR 7.3MM CANNULATED SCREWS 2.8MM X 300MM (3TX6=18)(6EA/PK)

## (undated) DEVICE — GLOVE BIOGEL SZ 7.5 SURGICAL PF LTX - (50PR/BX 4BX/CA)

## (undated) DEVICE — DRAPE 36X28IN RAD CARM BND BG - (25/CA) O

## (undated) DEVICE — TUBING CLEARLINK DUO-VENT - C-FLO (48EA/CA)

## (undated) DEVICE — LACTATED RINGERS INJ 1000 ML - (14EA/CA 60CA/PF)

## (undated) DEVICE — SODIUM CHL IRRIGATION 0.9% 1000ML (12EA/CA)

## (undated) DEVICE — GLOVE BIOGEL INDICATOR SZ 8 SURGICAL PF LTX - (50/BX 4BX/CA)

## (undated) DEVICE — GOWN WARMING STANDARD FLEX - (30/CA)

## (undated) DEVICE — PIN HALF SELF TAPPING 5MM X 45MM 150MM LENGTH (2TX6=12)

## (undated) DEVICE — BANDAGE ELASTIC 6 HONEYCOMB - 6X5YD LF (20/CA)"

## (undated) DEVICE — SUCTION INSTRUMENT YANKAUER BULBOUS TIP W/O VENT (50EA/CA)

## (undated) DEVICE — DRAPE INCISE  INVISISHIELD 36 X 17.5 IN - (40/CA)

## (undated) DEVICE — GOWN SURGEONS X-LARGE - DISP. (30/CA)

## (undated) DEVICE — BANDAGE ELASTIC 4 HONEYCOMB - 4"X5YD LF (20/CA)"